# Patient Record
Sex: MALE | Race: WHITE | Employment: FULL TIME | URBAN - METROPOLITAN AREA
[De-identification: names, ages, dates, MRNs, and addresses within clinical notes are randomized per-mention and may not be internally consistent; named-entity substitution may affect disease eponyms.]

---

## 2018-03-24 ENCOUNTER — OFFICE VISIT (OUTPATIENT)
Dept: FAMILY MEDICINE CLINIC | Facility: CLINIC | Age: 43
End: 2018-03-24

## 2018-03-24 VITALS
SYSTOLIC BLOOD PRESSURE: 110 MMHG | TEMPERATURE: 98 F | DIASTOLIC BLOOD PRESSURE: 68 MMHG | OXYGEN SATURATION: 99 % | HEART RATE: 91 BPM

## 2018-03-24 DIAGNOSIS — J06.9 VIRAL UPPER RESPIRATORY TRACT INFECTION: Primary | ICD-10-CM

## 2018-03-24 LAB
CONTROL LINE PRESENT WITH A CLEAR BACKGROUND (YES/NO): YES YES/NO
KIT EXPIRATION DATE: 11.2 DATE

## 2018-03-24 PROCEDURE — 87880 STREP A ASSAY W/OPTIC: CPT | Performed by: NURSE PRACTITIONER

## 2018-03-24 PROCEDURE — 99213 OFFICE O/P EST LOW 20 MIN: CPT | Performed by: NURSE PRACTITIONER

## 2018-03-24 NOTE — PROGRESS NOTES
CHIEF COMPLAINT:   Patient presents with:  URI      HPI:   Donya Aguayo is a 41year old male who presents for upper respiratory symptoms for  3 days. Patient reports sore throat, congestion, cough with yellow colored sputum, denies fever.  Symptoms have bee Ivonne Sharif is a 41year old male who presents with upper respiratory symptoms that are consistent with    ASSESSMENT:   Viral upper respiratory tract infection  (primary encounter diagnosis)    PLAN: Meds as below.   Comfort care as described in Patient In · You may use acetaminophen or ibuprofen to control pain and fever, unless another medicine was prescribed. If you have chronic liver or kidney disease, have ever had a stomach ulcer or gastrointestinal bleeding, or are taking blood-thinning medicines, amy

## 2018-03-26 ENCOUNTER — OFFICE VISIT (OUTPATIENT)
Dept: FAMILY MEDICINE CLINIC | Facility: CLINIC | Age: 43
End: 2018-03-26

## 2018-03-26 ENCOUNTER — TELEPHONE (OUTPATIENT)
Dept: FAMILY MEDICINE CLINIC | Facility: CLINIC | Age: 43
End: 2018-03-26

## 2018-03-26 VITALS
DIASTOLIC BLOOD PRESSURE: 80 MMHG | BODY MASS INDEX: 33.12 KG/M2 | WEIGHT: 218.5 LBS | OXYGEN SATURATION: 98 % | TEMPERATURE: 98 F | HEART RATE: 88 BPM | SYSTOLIC BLOOD PRESSURE: 120 MMHG | HEIGHT: 68 IN

## 2018-03-26 DIAGNOSIS — Z00.00 ROUTINE GENERAL MEDICAL EXAMINATION AT A HEALTH CARE FACILITY: Primary | ICD-10-CM

## 2018-03-26 DIAGNOSIS — E66.9 OBESITY (BMI 30.0-34.9): ICD-10-CM

## 2018-03-26 DIAGNOSIS — J30.89 NON-SEASONAL ALLERGIC RHINITIS, UNSPECIFIED TRIGGER: ICD-10-CM

## 2018-03-26 DIAGNOSIS — Z00.00 LABORATORY EXAM ORDERED AS PART OF ROUTINE GENERAL MEDICAL EXAMINATION: ICD-10-CM

## 2018-03-26 DIAGNOSIS — R05.9 COUGH: ICD-10-CM

## 2018-03-26 DIAGNOSIS — Z23 NEED FOR DIPHTHERIA-TETANUS-PERTUSSIS (TDAP) VACCINE: ICD-10-CM

## 2018-03-26 PROBLEM — E66.811 OBESITY (BMI 30.0-34.9): Status: ACTIVE | Noted: 2018-03-26

## 2018-03-26 PROCEDURE — 90471 IMMUNIZATION ADMIN: CPT | Performed by: FAMILY MEDICINE

## 2018-03-26 PROCEDURE — 90715 TDAP VACCINE 7 YRS/> IM: CPT | Performed by: FAMILY MEDICINE

## 2018-03-26 PROCEDURE — 99386 PREV VISIT NEW AGE 40-64: CPT | Performed by: FAMILY MEDICINE

## 2018-03-26 RX ORDER — PREDNISONE 20 MG/1
TABLET ORAL
Qty: 15 TABLET | Refills: 0 | Status: SHIPPED | OUTPATIENT
Start: 2018-03-26 | End: 2018-04-16 | Stop reason: ALTCHOICE

## 2018-03-26 RX ORDER — FLUTICASONE PROPIONATE 50 MCG
2 SPRAY, SUSPENSION (ML) NASAL DAILY
Qty: 3 BOTTLE | Refills: 3 | Status: SHIPPED | OUTPATIENT
Start: 2018-03-26 | End: 2019-03-21

## 2018-03-26 RX ORDER — PREDNISONE 20 MG/1
TABLET ORAL
Qty: 15 TABLET | Refills: 0 | Status: SHIPPED | OUTPATIENT
Start: 2018-03-26 | End: 2018-03-26

## 2018-03-26 NOTE — PROGRESS NOTES
Juanjose Amador is a 41year old male. CC:  Patient presents with:  Physical: seen in walkin care for cough. . pt reports feeling better. .room 5      HPI:  Here for routine  States he as a current cough  Is sl better on delsym  dayquil    Still sporadic no exertion  cough  CARDIOVASCULAR: denies chest pain on exertion  GI: denies abdominal pain,denies heartburn   : denies nocturia or changes in stream  MUSCULOSKELETAL: denies back pain  NEURO: denies headaches  PSYCHE: denies depression or anxiety  HEMATOL DIFFERENTIAL WITH PLATELET    COMP METABOLIC PANEL (14)    LIPID PANEL    VITAMIN D, 25-HYDROXY            No orders of the defined types were placed in this encounter.       Diagnoses and all orders for this visit:    Routine general medical examination at

## 2018-03-28 ENCOUNTER — LABORATORY ENCOUNTER (OUTPATIENT)
Dept: LAB | Age: 43
End: 2018-03-28
Attending: FAMILY MEDICINE
Payer: COMMERCIAL

## 2018-03-28 DIAGNOSIS — Z00.00 LABORATORY EXAM ORDERED AS PART OF ROUTINE GENERAL MEDICAL EXAMINATION: ICD-10-CM

## 2018-03-28 DIAGNOSIS — R73.09 ELEVATED GLUCOSE: ICD-10-CM

## 2018-03-28 LAB
25-HYDROXYVITAMIN D (TOTAL): 10.8 NG/ML (ref 30–100)
ALBUMIN SERPL-MCNC: 3.8 G/DL (ref 3.5–4.8)
ALP LIVER SERPL-CCNC: 82 U/L (ref 45–117)
ALT SERPL-CCNC: 55 U/L (ref 17–63)
AST SERPL-CCNC: 37 U/L (ref 15–41)
BASOPHILS # BLD AUTO: 0.03 X10(3) UL (ref 0–0.1)
BASOPHILS NFR BLD AUTO: 0.4 %
BILIRUB SERPL-MCNC: 0.6 MG/DL (ref 0.1–2)
BUN BLD-MCNC: 18 MG/DL (ref 8–20)
CALCIUM BLD-MCNC: 9.2 MG/DL (ref 8.3–10.3)
CHLORIDE: 105 MMOL/L (ref 101–111)
CHOLEST SMN-MCNC: 208 MG/DL (ref ?–200)
CO2: 27 MMOL/L (ref 22–32)
CREAT BLD-MCNC: 1.23 MG/DL (ref 0.7–1.3)
EOSINOPHIL # BLD AUTO: 0.06 X10(3) UL (ref 0–0.3)
EOSINOPHIL NFR BLD AUTO: 0.9 %
ERYTHROCYTE [DISTWIDTH] IN BLOOD BY AUTOMATED COUNT: 12.9 % (ref 11.5–16)
GLUCOSE BLD-MCNC: 112 MG/DL (ref 70–99)
HCT VFR BLD AUTO: 48.5 % (ref 37–53)
HDLC SERPL-MCNC: 30 MG/DL (ref 45–?)
HDLC SERPL: 6.93 {RATIO} (ref ?–4.97)
HGB BLD-MCNC: 16.3 G/DL (ref 13–17)
IMMATURE GRANULOCYTE COUNT: 0.04 X10(3) UL (ref 0–1)
IMMATURE GRANULOCYTE RATIO %: 0.6 %
LDLC SERPL CALC-MCNC: 130 MG/DL (ref ?–130)
LYMPHOCYTES # BLD AUTO: 2.17 X10(3) UL (ref 0.9–4)
LYMPHOCYTES NFR BLD AUTO: 31.4 %
M PROTEIN MFR SERPL ELPH: 7.5 G/DL (ref 6.1–8.3)
MCH RBC QN AUTO: 32.8 PG (ref 27–33.2)
MCHC RBC AUTO-ENTMCNC: 33.6 G/DL (ref 31–37)
MCV RBC AUTO: 97.6 FL (ref 80–99)
MONOCYTES # BLD AUTO: 0.62 X10(3) UL (ref 0.1–1)
MONOCYTES NFR BLD AUTO: 9 %
NEUTROPHIL ABS PRELIM: 4 X10 (3) UL (ref 1.3–6.7)
NEUTROPHILS # BLD AUTO: 4 X10(3) UL (ref 1.3–6.7)
NEUTROPHILS NFR BLD AUTO: 57.7 %
NONHDLC SERPL-MCNC: 178 MG/DL (ref ?–130)
PLATELET # BLD AUTO: 293 10(3)UL (ref 150–450)
POTASSIUM SERPL-SCNC: 3.9 MMOL/L (ref 3.6–5.1)
RBC # BLD AUTO: 4.97 X10(6)UL (ref 4.3–5.7)
RED CELL DISTRIBUTION WIDTH-SD: 46.1 FL (ref 35.1–46.3)
SODIUM SERPL-SCNC: 142 MMOL/L (ref 136–144)
TRIGL SERPL-MCNC: 240 MG/DL (ref ?–150)
VLDLC SERPL CALC-MCNC: 48 MG/DL (ref 5–40)
WBC # BLD AUTO: 6.9 X10(3) UL (ref 4–13)

## 2018-03-28 PROCEDURE — 83036 HEMOGLOBIN GLYCOSYLATED A1C: CPT | Performed by: FAMILY MEDICINE

## 2018-03-28 PROCEDURE — 82306 VITAMIN D 25 HYDROXY: CPT | Performed by: FAMILY MEDICINE

## 2018-03-28 PROCEDURE — 80061 LIPID PANEL: CPT | Performed by: FAMILY MEDICINE

## 2018-03-28 PROCEDURE — 80053 COMPREHEN METABOLIC PANEL: CPT | Performed by: FAMILY MEDICINE

## 2018-03-28 PROCEDURE — 85025 COMPLETE CBC W/AUTO DIFF WBC: CPT | Performed by: FAMILY MEDICINE

## 2018-03-28 PROCEDURE — 36415 COLL VENOUS BLD VENIPUNCTURE: CPT | Performed by: FAMILY MEDICINE

## 2018-03-30 LAB
EST. AVERAGE GLUCOSE BLD GHB EST-MCNC: 137 MG/DL (ref 68–126)
HBA1C MFR BLD HPLC: 6.4 % (ref ?–5.7)

## 2018-04-16 ENCOUNTER — OFFICE VISIT (OUTPATIENT)
Dept: FAMILY MEDICINE CLINIC | Facility: CLINIC | Age: 43
End: 2018-04-16

## 2018-04-16 VITALS
DIASTOLIC BLOOD PRESSURE: 76 MMHG | SYSTOLIC BLOOD PRESSURE: 120 MMHG | TEMPERATURE: 98 F | BODY MASS INDEX: 34 KG/M2 | WEIGHT: 222.13 LBS

## 2018-04-16 DIAGNOSIS — J01.90 ACUTE RHINOSINUSITIS: ICD-10-CM

## 2018-04-16 DIAGNOSIS — H65.01 RIGHT ACUTE SEROUS OTITIS MEDIA, RECURRENCE NOT SPECIFIED: Primary | ICD-10-CM

## 2018-04-16 PROCEDURE — 99213 OFFICE O/P EST LOW 20 MIN: CPT | Performed by: FAMILY MEDICINE

## 2018-04-16 RX ORDER — AMOXICILLIN AND CLAVULANATE POTASSIUM 875; 125 MG/1; MG/1
1 TABLET, FILM COATED ORAL 2 TIMES DAILY
Qty: 20 TABLET | Refills: 0 | Status: SHIPPED | OUTPATIENT
Start: 2018-04-16 | End: 2018-04-26

## 2018-04-16 RX ORDER — PREDNISONE 20 MG/1
TABLET ORAL
Qty: 15 TABLET | Refills: 0 | Status: SHIPPED | OUTPATIENT
Start: 2018-04-16 | End: 2019-11-21 | Stop reason: ALTCHOICE

## 2018-04-16 NOTE — PATIENT INSTRUCTIONS
Amoxicillin; Clavulanic Acid tablets  Brand Name: Augmentin  What is this medicine? AMOXICILLIN; CLAVULANIC ACID (a mox i REYNALDO in; AYUSH murguia ic AS id) is a penicillin antibiotic. It is used to treat certain kinds of bacterial infections.  It will not w If you miss a dose, take it as soon as you can. If it is almost time for your next dose, take only that dose. Do not take double or extra doses. Where should I keep my medicine? Keep out of the reach of children.   Store at room temperature below 25 degre

## 2018-04-16 NOTE — PROGRESS NOTES
HPI:   Bianca Alonzo is a 41year old male who presents for upper respiratory symptoms for  4  days.  Patient reports sore throat, congestion, ear pain, sinus pain, OTC cold meds have not been helping, denies fever     Used heating pad   Hears popping in the otitis media, recurrence not specified  (primary encounter diagnosis)  Acute rhinosinusitis    Problem List Items Addressed This Visit     None      Visit Diagnoses     Right acute serous otitis media, recurrence not specified    -  Primary    Relevant Med

## 2019-11-21 ENCOUNTER — OFFICE VISIT (OUTPATIENT)
Dept: FAMILY MEDICINE CLINIC | Facility: CLINIC | Age: 44
End: 2019-11-21
Payer: COMMERCIAL

## 2019-11-21 VITALS
WEIGHT: 220 LBS | TEMPERATURE: 98 F | HEIGHT: 68 IN | OXYGEN SATURATION: 99 % | SYSTOLIC BLOOD PRESSURE: 122 MMHG | HEART RATE: 86 BPM | RESPIRATION RATE: 18 BRPM | BODY MASS INDEX: 33.34 KG/M2 | DIASTOLIC BLOOD PRESSURE: 72 MMHG

## 2019-11-21 DIAGNOSIS — J01.40 ACUTE NON-RECURRENT PANSINUSITIS: Primary | ICD-10-CM

## 2019-11-21 DIAGNOSIS — E66.9 OBESITY (BMI 30.0-34.9): ICD-10-CM

## 2019-11-21 DIAGNOSIS — R73.03 PREDIABETES: ICD-10-CM

## 2019-11-21 DIAGNOSIS — J20.9 ACUTE BRONCHITIS, UNSPECIFIED ORGANISM: ICD-10-CM

## 2019-11-21 PROCEDURE — 99214 OFFICE O/P EST MOD 30 MIN: CPT | Performed by: NURSE PRACTITIONER

## 2019-11-21 RX ORDER — AMOXICILLIN AND CLAVULANATE POTASSIUM 875; 125 MG/1; MG/1
1 TABLET, FILM COATED ORAL 2 TIMES DAILY
Qty: 20 TABLET | Refills: 0 | Status: SHIPPED | OUTPATIENT
Start: 2019-11-21 | End: 2019-12-01

## 2019-11-21 RX ORDER — PREDNISONE 20 MG/1
20 TABLET ORAL 2 TIMES DAILY
Qty: 10 TABLET | Refills: 0 | Status: SHIPPED | OUTPATIENT
Start: 2019-11-21 | End: 2019-11-26

## 2019-11-21 NOTE — PROGRESS NOTES
HPI:   Sinus Problem   This is a new problem. Episode onset: 2 weeks ago. The problem has been waxing and waning since onset. Maximum temperature: first few days. Associated symptoms include chills, congestion, coughing, sinus pressure and a sore throat. tympanic membrane and ear canal normal.   Nose: Mucosal edema and rhinorrhea present. Mouth/Throat: Uvula is midline and mucous membranes are normal. Posterior oropharyngeal erythema present.  No oropharyngeal exudate, posterior oropharyngeal edema or ton

## 2021-06-08 ENCOUNTER — TELEPHONE (OUTPATIENT)
Dept: FAMILY MEDICINE CLINIC | Facility: CLINIC | Age: 46
End: 2021-06-08

## 2021-06-08 NOTE — TELEPHONE ENCOUNTER
Spouse noted a red \"spot\" on shoulder a few days ago. She is requesting referral to a dermatologist. No hx of skin ca or skin problems.     Advised will need to check w/Dr. Calvo tomorrow regarding referral request.      Would you like to see the patien

## 2021-06-09 NOTE — TELEPHONE ENCOUNTER
Advised spouse that patient has not been seen since 2018.  Patient will need to be seen before we can send a referral or patient can try to schedule without a referral.

## 2021-06-25 ENCOUNTER — OFFICE VISIT (OUTPATIENT)
Dept: FAMILY MEDICINE CLINIC | Facility: CLINIC | Age: 46
End: 2021-06-25
Payer: COMMERCIAL

## 2021-06-25 ENCOUNTER — LABORATORY ENCOUNTER (OUTPATIENT)
Dept: LAB | Age: 46
End: 2021-06-25
Attending: FAMILY MEDICINE
Payer: COMMERCIAL

## 2021-06-25 VITALS
OXYGEN SATURATION: 99 % | SYSTOLIC BLOOD PRESSURE: 126 MMHG | WEIGHT: 223.5 LBS | RESPIRATION RATE: 16 BRPM | HEART RATE: 86 BPM | TEMPERATURE: 98 F | DIASTOLIC BLOOD PRESSURE: 78 MMHG | BODY MASS INDEX: 33.87 KG/M2 | HEIGHT: 68 IN

## 2021-06-25 DIAGNOSIS — E66.9 OBESITY (BMI 30.0-34.9): ICD-10-CM

## 2021-06-25 DIAGNOSIS — D22.9 MULTIPLE NEVI: ICD-10-CM

## 2021-06-25 DIAGNOSIS — Z13.1 SCREENING FOR DIABETES MELLITUS: ICD-10-CM

## 2021-06-25 DIAGNOSIS — Z00.00 LABORATORY EXAMINATION ORDERED AS PART OF A ROUTINE GENERAL MEDICAL EXAMINATION: ICD-10-CM

## 2021-06-25 DIAGNOSIS — Z13.0 SCREENING, ANEMIA, DEFICIENCY, IRON: ICD-10-CM

## 2021-06-25 DIAGNOSIS — R73.9 HYPERGLYCEMIA: ICD-10-CM

## 2021-06-25 DIAGNOSIS — Z13.29 SCREENING FOR THYROID DISORDER: ICD-10-CM

## 2021-06-25 DIAGNOSIS — Z13.220 SCREENING, LIPID: ICD-10-CM

## 2021-06-25 DIAGNOSIS — Z00.00 WELL ADULT EXAM: Primary | ICD-10-CM

## 2021-06-25 PROCEDURE — 3078F DIAST BP <80 MM HG: CPT | Performed by: FAMILY MEDICINE

## 2021-06-25 PROCEDURE — 99396 PREV VISIT EST AGE 40-64: CPT | Performed by: FAMILY MEDICINE

## 2021-06-25 PROCEDURE — 3008F BODY MASS INDEX DOCD: CPT | Performed by: FAMILY MEDICINE

## 2021-06-25 PROCEDURE — 3074F SYST BP LT 130 MM HG: CPT | Performed by: FAMILY MEDICINE

## 2021-06-25 PROCEDURE — 83036 HEMOGLOBIN GLYCOSYLATED A1C: CPT | Performed by: FAMILY MEDICINE

## 2021-06-25 PROCEDURE — 84439 ASSAY OF FREE THYROXINE: CPT | Performed by: FAMILY MEDICINE

## 2021-06-25 PROCEDURE — 80050 GENERAL HEALTH PANEL: CPT | Performed by: FAMILY MEDICINE

## 2021-06-25 PROCEDURE — 80061 LIPID PANEL: CPT | Performed by: FAMILY MEDICINE

## 2021-06-25 NOTE — PROGRESS NOTES
HPI/Subjective:   Yuridia Tinoco is a 42year old male who presents for Physical ( Patient has a spot on shoulder he would like to have looked at and labs )     ***  History/Other:   No current outpatient medications on file.       Review of Systems:  GENERA

## 2021-06-26 NOTE — PROGRESS NOTES
Solo Garcia is a 42year old male.     CC:  Patient presents with:  Physical:  Patient has a spot on shoulder he would like to have looked at and labs       HPI/Subjective:    Chief Complaint Reviewed and Verified  Nursing Notes Reviewed and   Verified (99.8 kg)  04/16/18 : 222 lb 2 oz (100.8 kg)  03/26/18 : 218 lb 8 oz (99.1 kg)      BP Readings from Last 3 Encounters:  06/25/21 : 126/78  11/21/19 : 122/72  04/16/18 : 120/76    REVIEW OF SYSTEMS:   GENERAL: feels well otherwise  SKIN: see HPI   EYES:den Discussed weight loss through caloric reduction and aerobic exercise               Other Visit Diagnoses     Well adult exam    -  Primary    Laboratory examination ordered as part of a routine general medical examination        Relevant Orders    CBC WIT

## 2021-06-29 ENCOUNTER — PATIENT MESSAGE (OUTPATIENT)
Dept: FAMILY MEDICINE CLINIC | Facility: CLINIC | Age: 46
End: 2021-06-29

## 2021-06-29 DIAGNOSIS — R73.9 HYPERGLYCEMIA: Primary | ICD-10-CM

## 2021-06-29 DIAGNOSIS — Z13.0 SCREENING, ANEMIA, DEFICIENCY, IRON: ICD-10-CM

## 2021-06-29 DIAGNOSIS — E03.9 ACQUIRED HYPOTHYROIDISM: ICD-10-CM

## 2021-06-29 DIAGNOSIS — E78.2 MIXED HYPERLIPIDEMIA: ICD-10-CM

## 2021-06-29 DIAGNOSIS — I10 ESSENTIAL HYPERTENSION, BENIGN: ICD-10-CM

## 2021-06-29 RX ORDER — ATORVASTATIN CALCIUM 20 MG/1
20 TABLET, FILM COATED ORAL NIGHTLY
Qty: 90 TABLET | Refills: 1 | Status: SHIPPED | OUTPATIENT
Start: 2021-06-29 | End: 2021-10-05

## 2021-06-29 RX ORDER — METFORMIN HYDROCHLORIDE 500 MG/1
500 TABLET, EXTENDED RELEASE ORAL
Qty: 90 TABLET | Refills: 1 | Status: SHIPPED | OUTPATIENT
Start: 2021-06-29 | End: 2021-10-05

## 2021-06-29 NOTE — TELEPHONE ENCOUNTER
From: Toney Chappell  To: Niraj Aviles MD  Sent: 6/29/2021 11:48 AM CDT  Subject: Prescription Question    I saw the lab results and your RX suggestion in the [employer] [facility].  Do I need to set an appointment to return to get the RX's or can you just send th

## 2021-06-29 NOTE — TELEPHONE ENCOUNTER
From: Yesenia Sanchez  To: Jeanine Jackson MD  Sent: 6/29/2021 12:31 PM CDT  Subject: Non-Urgent Medical Question    Ok thanks!  In reference to the 6 month and 1 year labs, is there a set appointment date / time already reserved for those or do I need to call

## 2021-06-30 NOTE — TELEPHONE ENCOUNTER
From: Azalea Artis  To: Kary Carter MD  Sent: 6/29/2021 9:42 PM CDT  Subject: Prescription Question    Can I take Nexium with my new medications?

## 2021-09-09 ENCOUNTER — PATIENT MESSAGE (OUTPATIENT)
Dept: FAMILY MEDICINE CLINIC | Facility: CLINIC | Age: 46
End: 2021-09-09

## 2021-09-09 NOTE — TELEPHONE ENCOUNTER
[id number] Joyce Pritchett for this  I approve    We can send strips  But need to know the type of monitor and strips

## 2021-09-09 NOTE — TELEPHONE ENCOUNTER
From: Carlos Kiran  To: Iveth Sierra MD  Sent: 9/9/2021 5:11 AM CDT  Subject: Prescription Question    [facility].  Since they noticed that you prescribed me Metformin, they sent me a glucose meter and sample princess

## 2021-09-10 RX ORDER — BLOOD SUGAR DIAGNOSTIC
STRIP MISCELLANEOUS
Qty: 100 STRIP | Refills: 1 | Status: SHIPPED | OUTPATIENT
Start: 2021-09-10 | End: 2022-09-10

## 2021-09-10 RX ORDER — BLOOD SUGAR DIAGNOSTIC
STRIP MISCELLANEOUS
Qty: 100 STRIP | Refills: 1 | Status: SHIPPED | OUTPATIENT
Start: 2021-09-10 | End: 2021-09-10

## 2021-09-22 ENCOUNTER — TELEPHONE (OUTPATIENT)
Dept: FAMILY MEDICINE CLINIC | Facility: CLINIC | Age: 46
End: 2021-09-22

## 2021-09-22 ENCOUNTER — MED REC SCAN ONLY (OUTPATIENT)
Dept: FAMILY MEDICINE CLINIC | Facility: CLINIC | Age: 46
End: 2021-09-22

## 2021-10-05 DIAGNOSIS — E78.2 MIXED HYPERLIPIDEMIA: ICD-10-CM

## 2021-10-05 DIAGNOSIS — R73.9 HYPERGLYCEMIA: ICD-10-CM

## 2021-10-05 RX ORDER — ATORVASTATIN CALCIUM 20 MG/1
20 TABLET, FILM COATED ORAL NIGHTLY
Qty: 90 TABLET | Refills: 1 | Status: SHIPPED | OUTPATIENT
Start: 2021-10-05

## 2021-10-05 RX ORDER — METFORMIN HYDROCHLORIDE 500 MG/1
500 TABLET, EXTENDED RELEASE ORAL
Qty: 90 TABLET | Refills: 1 | Status: SHIPPED | OUTPATIENT
Start: 2021-10-05

## 2021-10-05 NOTE — TELEPHONE ENCOUNTER
LOV 06/25/2021    LAST LAB 06/25/2021    LAST RX  Metformin  Atorvastatin    Next OV No future appointments.     PROTOCOL

## 2021-10-08 ENCOUNTER — PATIENT MESSAGE (OUTPATIENT)
Dept: FAMILY MEDICINE CLINIC | Facility: CLINIC | Age: 46
End: 2021-10-08

## 2021-10-09 NOTE — TELEPHONE ENCOUNTER
From: Solo Garcia  To: Kathia Maldonado MD  Sent: 10/8/2021 7:12 PM CDT  Subject: Metformin causing heartburn/ phlegm    Prior to being prescribed Metformin I was taking Nexium for heartburn symptoms.  I didn’t have any chest area pain, but I was experiencin

## 2022-01-13 ENCOUNTER — TELEPHONE (OUTPATIENT)
Dept: FAMILY MEDICINE CLINIC | Facility: CLINIC | Age: 47
End: 2022-01-13

## 2022-07-01 ENCOUNTER — OFFICE VISIT (OUTPATIENT)
Dept: FAMILY MEDICINE CLINIC | Facility: CLINIC | Age: 47
End: 2022-07-01
Payer: COMMERCIAL

## 2022-07-01 ENCOUNTER — LABORATORY ENCOUNTER (OUTPATIENT)
Dept: LAB | Age: 47
End: 2022-07-01
Attending: FAMILY MEDICINE
Payer: COMMERCIAL

## 2022-07-01 ENCOUNTER — TELEPHONE (OUTPATIENT)
Dept: FAMILY MEDICINE CLINIC | Facility: CLINIC | Age: 47
End: 2022-07-01

## 2022-07-01 VITALS
TEMPERATURE: 98 F | WEIGHT: 214 LBS | HEIGHT: 68 IN | RESPIRATION RATE: 16 BRPM | HEART RATE: 74 BPM | OXYGEN SATURATION: 98 % | DIASTOLIC BLOOD PRESSURE: 80 MMHG | SYSTOLIC BLOOD PRESSURE: 120 MMHG | BODY MASS INDEX: 32.43 KG/M2

## 2022-07-01 DIAGNOSIS — E03.9 ACQUIRED HYPOTHYROIDISM: ICD-10-CM

## 2022-07-01 DIAGNOSIS — E11.9 TYPE 2 DIABETES MELLITUS WITHOUT COMPLICATION, WITHOUT LONG-TERM CURRENT USE OF INSULIN (HCC): ICD-10-CM

## 2022-07-01 DIAGNOSIS — E78.2 MIXED HYPERLIPIDEMIA: ICD-10-CM

## 2022-07-01 DIAGNOSIS — J01.40 ACUTE NON-RECURRENT PANSINUSITIS: Primary | ICD-10-CM

## 2022-07-01 DIAGNOSIS — Z13.0 SCREENING, ANEMIA, DEFICIENCY, IRON: ICD-10-CM

## 2022-07-01 LAB
ALBUMIN SERPL-MCNC: 4.1 G/DL (ref 3.4–5)
ALBUMIN/GLOB SERPL: 1.3 {RATIO} (ref 1–2)
ALP LIVER SERPL-CCNC: 87 U/L
ALT SERPL-CCNC: 43 U/L
ANION GAP SERPL CALC-SCNC: 5 MMOL/L (ref 0–18)
AST SERPL-CCNC: 33 U/L (ref 15–37)
BASOPHILS # BLD AUTO: 0.05 X10(3) UL (ref 0–0.2)
BASOPHILS NFR BLD AUTO: 0.6 %
BILIRUB SERPL-MCNC: 0.6 MG/DL (ref 0.1–2)
BUN BLD-MCNC: 18 MG/DL (ref 7–18)
CALCIUM BLD-MCNC: 10 MG/DL (ref 8.5–10.1)
CHLORIDE SERPL-SCNC: 106 MMOL/L (ref 98–112)
CHOLEST SERPL-MCNC: 236 MG/DL (ref ?–200)
CO2 SERPL-SCNC: 28 MMOL/L (ref 21–32)
CREAT BLD-MCNC: 1.41 MG/DL
CREAT UR-SCNC: 197 MG/DL
EOSINOPHIL # BLD AUTO: 0.17 X10(3) UL (ref 0–0.7)
EOSINOPHIL NFR BLD AUTO: 2.2 %
ERYTHROCYTE [DISTWIDTH] IN BLOOD BY AUTOMATED COUNT: 13 %
EST. AVERAGE GLUCOSE BLD GHB EST-MCNC: 154 MG/DL (ref 68–126)
FASTING PATIENT LIPID ANSWER: NO
FASTING STATUS PATIENT QL REPORTED: NO
GLOBULIN PLAS-MCNC: 3.2 G/DL (ref 2.8–4.4)
GLUCOSE BLD-MCNC: 134 MG/DL (ref 70–99)
HBA1C MFR BLD: 7 % (ref ?–5.7)
HCT VFR BLD AUTO: 50.9 %
HDLC SERPL-MCNC: 36 MG/DL (ref 40–59)
HGB BLD-MCNC: 16.6 G/DL
IMM GRANULOCYTES # BLD AUTO: 0.04 X10(3) UL (ref 0–1)
IMM GRANULOCYTES NFR BLD: 0.5 %
LDLC SERPL CALC-MCNC: 143 MG/DL (ref ?–100)
LYMPHOCYTES # BLD AUTO: 2.17 X10(3) UL (ref 1–4)
LYMPHOCYTES NFR BLD AUTO: 27.9 %
MCH RBC QN AUTO: 32.4 PG (ref 26–34)
MCHC RBC AUTO-ENTMCNC: 32.6 G/DL (ref 31–37)
MCV RBC AUTO: 99.2 FL
MICROALBUMIN UR-MCNC: 1.39 MG/DL
MICROALBUMIN/CREAT 24H UR-RTO: 7.1 UG/MG (ref ?–30)
MONOCYTES # BLD AUTO: 0.52 X10(3) UL (ref 0.1–1)
MONOCYTES NFR BLD AUTO: 6.7 %
NEUTROPHILS # BLD AUTO: 4.84 X10 (3) UL (ref 1.5–7.7)
NEUTROPHILS # BLD AUTO: 4.84 X10(3) UL (ref 1.5–7.7)
NEUTROPHILS NFR BLD AUTO: 62.1 %
NONHDLC SERPL-MCNC: 200 MG/DL (ref ?–130)
OSMOLALITY SERPL CALC.SUM OF ELEC: 292 MOSM/KG (ref 275–295)
PLATELET # BLD AUTO: 271 10(3)UL (ref 150–450)
POTASSIUM SERPL-SCNC: 5 MMOL/L (ref 3.5–5.1)
PROT SERPL-MCNC: 7.3 G/DL (ref 6.4–8.2)
RBC # BLD AUTO: 5.13 X10(6)UL
SODIUM SERPL-SCNC: 139 MMOL/L (ref 136–145)
T4 FREE SERPL-MCNC: 0.9 NG/DL (ref 0.8–1.7)
TRIGL SERPL-MCNC: 310 MG/DL (ref 30–149)
TSI SER-ACNC: 1.9 MIU/ML (ref 0.36–3.74)
VLDLC SERPL CALC-MCNC: 59 MG/DL (ref 0–30)
WBC # BLD AUTO: 7.8 X10(3) UL (ref 4–11)

## 2022-07-01 PROCEDURE — 84439 ASSAY OF FREE THYROXINE: CPT

## 2022-07-01 PROCEDURE — 85025 COMPLETE CBC W/AUTO DIFF WBC: CPT

## 2022-07-01 PROCEDURE — 82570 ASSAY OF URINE CREATININE: CPT

## 2022-07-01 PROCEDURE — 36415 COLL VENOUS BLD VENIPUNCTURE: CPT

## 2022-07-01 PROCEDURE — 3079F DIAST BP 80-89 MM HG: CPT | Performed by: FAMILY MEDICINE

## 2022-07-01 PROCEDURE — 3008F BODY MASS INDEX DOCD: CPT | Performed by: FAMILY MEDICINE

## 2022-07-01 PROCEDURE — 82043 UR ALBUMIN QUANTITATIVE: CPT

## 2022-07-01 PROCEDURE — 99214 OFFICE O/P EST MOD 30 MIN: CPT | Performed by: FAMILY MEDICINE

## 2022-07-01 PROCEDURE — 80061 LIPID PANEL: CPT

## 2022-07-01 PROCEDURE — 3051F HG A1C>EQUAL 7.0%<8.0%: CPT | Performed by: FAMILY MEDICINE

## 2022-07-01 PROCEDURE — 84443 ASSAY THYROID STIM HORMONE: CPT

## 2022-07-01 PROCEDURE — 80053 COMPREHEN METABOLIC PANEL: CPT

## 2022-07-01 PROCEDURE — 3074F SYST BP LT 130 MM HG: CPT | Performed by: FAMILY MEDICINE

## 2022-07-01 PROCEDURE — 83036 HEMOGLOBIN GLYCOSYLATED A1C: CPT

## 2022-07-01 RX ORDER — AMOXICILLIN AND CLAVULANATE POTASSIUM 875; 125 MG/1; MG/1
1 TABLET, FILM COATED ORAL 2 TIMES DAILY
Qty: 20 TABLET | Refills: 0 | Status: SHIPPED | OUTPATIENT
Start: 2022-07-01 | End: 2022-07-11

## 2022-07-01 NOTE — TELEPHONE ENCOUNTER
Pt was prescribed medication today. He said it should have been sent to [facility] in [place].

## 2022-07-01 NOTE — PATIENT INSTRUCTIONS
Recommend nasal saline  Augmentin 875 mg twice daily x10 days taken with food, potential GI side effects discussed  I reviewed goals for fasting and postmeal blood sugars as well as hemoglobin A1c, blood pressure lipids. I reviewed conservative management of hypertension including sodium restriction, daily aerobic activity, alcohol moderation, and maintaining ideal body weight. I discussed importance of annual dilated retinal exams and glaucoma screening, routine foot exams and good fitting footwear. Will check lipids, A1c, CMP, CBC, microalbumin screen and thyroid function studies.   Patient advised to follow-up with his PCP for discussion of ongoing medical conditions and review of lab results

## 2022-10-18 ENCOUNTER — TELEPHONE (OUTPATIENT)
Dept: FAMILY MEDICINE CLINIC | Facility: CLINIC | Age: 47
End: 2022-10-18

## 2022-10-18 NOTE — TELEPHONE ENCOUNTER
He needs a follow up for medications soon because he needs the diabetic medicine.  He was here for the labs she said

## 2022-10-18 NOTE — TELEPHONE ENCOUNTER
Future Appointments   Date Time Provider Grey Mejia   10/27/2022  9:40 AM Gleason, Cephas Galeazzi, MD [facility]   10/27/2022 10:30 AM REF [facility] REF [facility] Ref Lab [facility]

## 2022-10-27 ENCOUNTER — LABORATORY ENCOUNTER (OUTPATIENT)
Dept: LAB | Age: 47
End: 2022-10-27
Attending: FAMILY MEDICINE
Payer: COMMERCIAL

## 2022-10-27 ENCOUNTER — PATIENT MESSAGE (OUTPATIENT)
Dept: FAMILY MEDICINE CLINIC | Facility: CLINIC | Age: 47
End: 2022-10-27

## 2022-10-27 ENCOUNTER — OFFICE VISIT (OUTPATIENT)
Dept: FAMILY MEDICINE CLINIC | Facility: CLINIC | Age: 47
End: 2022-10-27
Payer: COMMERCIAL

## 2022-10-27 VITALS
WEIGHT: 216 LBS | BODY MASS INDEX: 33.12 KG/M2 | RESPIRATION RATE: 12 BRPM | DIASTOLIC BLOOD PRESSURE: 78 MMHG | SYSTOLIC BLOOD PRESSURE: 110 MMHG | OXYGEN SATURATION: 96 % | TEMPERATURE: 97 F | HEIGHT: 67.75 IN | HEART RATE: 64 BPM

## 2022-10-27 DIAGNOSIS — E66.9 OBESITY (BMI 30.0-34.9): ICD-10-CM

## 2022-10-27 DIAGNOSIS — E78.2 MIXED HYPERLIPIDEMIA: ICD-10-CM

## 2022-10-27 DIAGNOSIS — Z28.21 REFUSED INFLUENZA VACCINE: ICD-10-CM

## 2022-10-27 DIAGNOSIS — Z00.00 LABORATORY EXAMINATION ORDERED AS PART OF A ROUTINE GENERAL MEDICAL EXAMINATION: ICD-10-CM

## 2022-10-27 DIAGNOSIS — E11.9 TYPE 2 DIABETES MELLITUS WITHOUT COMPLICATION, WITHOUT LONG-TERM CURRENT USE OF INSULIN (HCC): ICD-10-CM

## 2022-10-27 DIAGNOSIS — R73.9 HYPERGLYCEMIA: ICD-10-CM

## 2022-10-27 DIAGNOSIS — Z13.29 SCREENING FOR THYROID DISORDER: ICD-10-CM

## 2022-10-27 DIAGNOSIS — Z00.00 WELL ADULT EXAM: Primary | ICD-10-CM

## 2022-10-27 LAB
ALBUMIN SERPL-MCNC: 4 G/DL (ref 3.4–5)
ALBUMIN/GLOB SERPL: 1.3 {RATIO} (ref 1–2)
ALP LIVER SERPL-CCNC: 87 U/L
ALT SERPL-CCNC: 37 U/L
ANION GAP SERPL CALC-SCNC: 7 MMOL/L (ref 0–18)
AST SERPL-CCNC: 20 U/L (ref 15–37)
BILIRUB SERPL-MCNC: 0.9 MG/DL (ref 0.1–2)
BUN BLD-MCNC: 20 MG/DL (ref 7–18)
CALCIUM BLD-MCNC: 9.2 MG/DL (ref 8.5–10.1)
CHLORIDE SERPL-SCNC: 107 MMOL/L (ref 98–112)
CHOLEST SERPL-MCNC: 285 MG/DL (ref ?–200)
CO2 SERPL-SCNC: 24 MMOL/L (ref 21–32)
CREAT BLD-MCNC: 1.33 MG/DL
CREAT UR-SCNC: 211 MG/DL
EST. AVERAGE GLUCOSE BLD GHB EST-MCNC: 171 MG/DL (ref 68–126)
FASTING PATIENT LIPID ANSWER: YES
FASTING STATUS PATIENT QL REPORTED: YES
GFR SERPLBLD BASED ON 1.73 SQ M-ARVRAT: 67 ML/MIN/1.73M2 (ref 60–?)
GLOBULIN PLAS-MCNC: 3.2 G/DL (ref 2.8–4.4)
GLUCOSE BLD-MCNC: 148 MG/DL (ref 70–99)
HBA1C MFR BLD: 7.6 % (ref ?–5.7)
HDLC SERPL-MCNC: 38 MG/DL (ref 40–59)
LDLC SERPL CALC-MCNC: 207 MG/DL (ref ?–100)
MICROALBUMIN UR-MCNC: 1.31 MG/DL
MICROALBUMIN/CREAT 24H UR-RTO: 6.2 UG/MG (ref ?–30)
NONHDLC SERPL-MCNC: 247 MG/DL (ref ?–130)
OSMOLALITY SERPL CALC.SUM OF ELEC: 291 MOSM/KG (ref 275–295)
POTASSIUM SERPL-SCNC: 4 MMOL/L (ref 3.5–5.1)
PROT SERPL-MCNC: 7.2 G/DL (ref 6.4–8.2)
SODIUM SERPL-SCNC: 138 MMOL/L (ref 136–145)
T4 FREE SERPL-MCNC: 1 NG/DL (ref 0.8–1.7)
TRIGL SERPL-MCNC: 204 MG/DL (ref 30–149)
TSI SER-ACNC: 2.07 MIU/ML (ref 0.36–3.74)
VLDLC SERPL CALC-MCNC: 45 MG/DL (ref 0–30)

## 2022-10-27 PROCEDURE — 80053 COMPREHEN METABOLIC PANEL: CPT | Performed by: FAMILY MEDICINE

## 2022-10-27 PROCEDURE — 82570 ASSAY OF URINE CREATININE: CPT | Performed by: FAMILY MEDICINE

## 2022-10-27 PROCEDURE — 83036 HEMOGLOBIN GLYCOSYLATED A1C: CPT | Performed by: FAMILY MEDICINE

## 2022-10-27 PROCEDURE — 3074F SYST BP LT 130 MM HG: CPT | Performed by: FAMILY MEDICINE

## 2022-10-27 PROCEDURE — 82043 UR ALBUMIN QUANTITATIVE: CPT | Performed by: FAMILY MEDICINE

## 2022-10-27 PROCEDURE — 84443 ASSAY THYROID STIM HORMONE: CPT | Performed by: FAMILY MEDICINE

## 2022-10-27 PROCEDURE — 99396 PREV VISIT EST AGE 40-64: CPT | Performed by: FAMILY MEDICINE

## 2022-10-27 PROCEDURE — 80061 LIPID PANEL: CPT | Performed by: FAMILY MEDICINE

## 2022-10-27 PROCEDURE — 3078F DIAST BP <80 MM HG: CPT | Performed by: FAMILY MEDICINE

## 2022-10-27 PROCEDURE — 84439 ASSAY OF FREE THYROXINE: CPT | Performed by: FAMILY MEDICINE

## 2022-10-27 PROCEDURE — 3008F BODY MASS INDEX DOCD: CPT | Performed by: FAMILY MEDICINE

## 2022-10-28 RX ORDER — METFORMIN HYDROCHLORIDE 500 MG/1
500 TABLET, EXTENDED RELEASE ORAL
Qty: 90 TABLET | Refills: 1 | Status: SHIPPED | OUTPATIENT
Start: 2022-10-28

## 2022-10-28 RX ORDER — ATORVASTATIN CALCIUM 20 MG/1
20 TABLET, FILM COATED ORAL NIGHTLY
Qty: 90 TABLET | Refills: 1 | Status: SHIPPED | OUTPATIENT
Start: 2022-10-28

## 2022-10-28 NOTE — TELEPHONE ENCOUNTER
From: Rd Joaquin  To: Yesenia Araujo MD  Sent: 10/27/2022 5:01 PM CDT  Subject: RX Refils    During todays visit, Dr Brian Mosqueda said he was going to send RX to [facility] for Metformin and Atorvastin, but it appears the RX was not ordered. Thanks!

## 2022-11-02 DIAGNOSIS — E11.9 TYPE 2 DIABETES MELLITUS WITHOUT COMPLICATION, WITHOUT LONG-TERM CURRENT USE OF INSULIN (HCC): Primary | ICD-10-CM

## 2022-11-02 DIAGNOSIS — E78.2 MIXED HYPERLIPIDEMIA: ICD-10-CM

## 2022-11-23 ENCOUNTER — TELEPHONE (OUTPATIENT)
Dept: FAMILY MEDICINE CLINIC | Facility: CLINIC | Age: 47
End: 2022-11-23

## 2022-11-23 NOTE — TELEPHONE ENCOUNTER
Agree   If fever or much worse may need urgent care or [facility]    Bu this is appropriate advice

## 2022-11-23 NOTE — TELEPHONE ENCOUNTER
Spoke with wife - pt has had runny nose/post nasal drip/cough for 4 days. Using Expectorant/cough syrup during the day and Nyquil at night. No fever. Encouraged increase in fluids/humidifier/steam shower/saline nasal spray.     Please advise

## 2023-06-30 ENCOUNTER — LABORATORY ENCOUNTER (OUTPATIENT)
Dept: LAB | Age: 48
End: 2023-06-30
Attending: FAMILY MEDICINE
Payer: COMMERCIAL

## 2023-06-30 ENCOUNTER — OFFICE VISIT (OUTPATIENT)
Dept: FAMILY MEDICINE CLINIC | Facility: CLINIC | Age: 48
End: 2023-06-30
Payer: COMMERCIAL

## 2023-06-30 VITALS
WEIGHT: 222 LBS | TEMPERATURE: 99 F | BODY MASS INDEX: 34 KG/M2 | DIASTOLIC BLOOD PRESSURE: 76 MMHG | HEART RATE: 78 BPM | SYSTOLIC BLOOD PRESSURE: 118 MMHG | OXYGEN SATURATION: 97 %

## 2023-06-30 DIAGNOSIS — E78.2 MIXED HYPERLIPIDEMIA: ICD-10-CM

## 2023-06-30 DIAGNOSIS — E11.9 TYPE 2 DIABETES MELLITUS WITHOUT COMPLICATION, WITHOUT LONG-TERM CURRENT USE OF INSULIN (HCC): Primary | ICD-10-CM

## 2023-06-30 DIAGNOSIS — E11.9 TYPE 2 DIABETES MELLITUS WITHOUT COMPLICATION, WITHOUT LONG-TERM CURRENT USE OF INSULIN (HCC): ICD-10-CM

## 2023-06-30 LAB
ALBUMIN SERPL-MCNC: 4 G/DL (ref 3.4–5)
ALBUMIN/GLOB SERPL: 1.3 {RATIO} (ref 1–2)
ALP LIVER SERPL-CCNC: 87 U/L
ALT SERPL-CCNC: 52 U/L
ANION GAP SERPL CALC-SCNC: 0 MMOL/L (ref 0–18)
AST SERPL-CCNC: 27 U/L (ref 15–37)
BILIRUB SERPL-MCNC: 0.7 MG/DL (ref 0.1–2)
BUN BLD-MCNC: 19 MG/DL (ref 7–18)
CALCIUM BLD-MCNC: 9.7 MG/DL (ref 8.5–10.1)
CHLORIDE SERPL-SCNC: 107 MMOL/L (ref 98–112)
CHOLEST SERPL-MCNC: 205 MG/DL (ref ?–200)
CO2 SERPL-SCNC: 31 MMOL/L (ref 21–32)
CREAT BLD-MCNC: 0.95 MG/DL
FASTING PATIENT LIPID ANSWER: NO
FASTING STATUS PATIENT QL REPORTED: NO
GFR SERPLBLD BASED ON 1.73 SQ M-ARVRAT: 100 ML/MIN/1.73M2 (ref 60–?)
GLOBULIN PLAS-MCNC: 3 G/DL (ref 2.8–4.4)
GLUCOSE BLD-MCNC: 171 MG/DL (ref 70–99)
HDLC SERPL-MCNC: 39 MG/DL (ref 40–59)
LDLC SERPL CALC-MCNC: 110 MG/DL (ref ?–100)
NONHDLC SERPL-MCNC: 166 MG/DL (ref ?–130)
OSMOLALITY SERPL CALC.SUM OF ELEC: 292 MOSM/KG (ref 275–295)
POTASSIUM SERPL-SCNC: 3.9 MMOL/L (ref 3.5–5.1)
PROT SERPL-MCNC: 7 G/DL (ref 6.4–8.2)
SODIUM SERPL-SCNC: 138 MMOL/L (ref 136–145)
TRIGL SERPL-MCNC: 323 MG/DL (ref 30–149)
VLDLC SERPL CALC-MCNC: 56 MG/DL (ref 0–30)

## 2023-06-30 PROCEDURE — 83036 HEMOGLOBIN GLYCOSYLATED A1C: CPT | Performed by: FAMILY MEDICINE

## 2023-06-30 PROCEDURE — 80053 COMPREHEN METABOLIC PANEL: CPT | Performed by: FAMILY MEDICINE

## 2023-06-30 PROCEDURE — 3078F DIAST BP <80 MM HG: CPT | Performed by: FAMILY MEDICINE

## 2023-06-30 PROCEDURE — 3074F SYST BP LT 130 MM HG: CPT | Performed by: FAMILY MEDICINE

## 2023-06-30 PROCEDURE — 80061 LIPID PANEL: CPT | Performed by: FAMILY MEDICINE

## 2023-06-30 PROCEDURE — 3052F HG A1C>EQUAL 8.0%<EQUAL 9.0%: CPT | Performed by: FAMILY MEDICINE

## 2023-06-30 PROCEDURE — 99214 OFFICE O/P EST MOD 30 MIN: CPT | Performed by: FAMILY MEDICINE

## 2023-06-30 RX ORDER — ATORVASTATIN CALCIUM 20 MG/1
20 TABLET, FILM COATED ORAL NIGHTLY
Qty: 90 TABLET | Refills: 1 | Status: SHIPPED | OUTPATIENT
Start: 2023-06-30

## 2023-07-01 LAB
EST. AVERAGE GLUCOSE BLD GHB EST-MCNC: 212 MG/DL (ref 68–126)
HBA1C MFR BLD: 9 % (ref ?–5.7)

## 2023-07-10 DIAGNOSIS — E11.9 TYPE 2 DIABETES MELLITUS WITHOUT COMPLICATION, WITHOUT LONG-TERM CURRENT USE OF INSULIN (HCC): Primary | ICD-10-CM

## 2023-07-10 DIAGNOSIS — E78.2 MIXED HYPERLIPIDEMIA: ICD-10-CM

## 2023-09-03 DIAGNOSIS — E11.9 TYPE 2 DIABETES MELLITUS WITHOUT COMPLICATION, WITHOUT LONG-TERM CURRENT USE OF INSULIN (HCC): ICD-10-CM

## 2023-09-05 RX ORDER — SITAGLIPTIN 100 MG/1
100 TABLET, FILM COATED ORAL DAILY
Qty: 30 TABLET | Refills: 1 | Status: SHIPPED | OUTPATIENT
Start: 2023-09-05 | End: 2023-09-11

## 2023-09-05 NOTE — TELEPHONE ENCOUNTER
Patient outreach : called pt to notify him his due next month for HgA1c and cmp blood work          LOV: 6-30-23  LAST LAB:6-30-23  LAST RX:    Medication Quantity Refills Start End   SITagliptin Phosphate 100 MG Oral Tab 30 tablet 1 6/30/2023 9/28/2023   Sig:   Take 1 tablet (100 mg total) by mouth daily. Next OV: No future appointments.    PROTOCOL    Diabetic Medication Protocol [id number]/03/2023 08:32 PM   Protocol Details Last HgBA1C < 7.5    HgBA1C procedure resulted in past 6 months    Microalbumin procedure in past 12 months or taking ACE/ARB    Appointment in past 6 or next 3 months

## 2023-10-11 ENCOUNTER — PATIENT MESSAGE (OUTPATIENT)
Dept: FAMILY MEDICINE CLINIC | Facility: CLINIC | Age: 48
End: 2023-10-11

## 2023-10-11 DIAGNOSIS — Z12.83 SCREENING EXAM FOR SKIN CANCER: Primary | ICD-10-CM

## 2023-10-12 NOTE — TELEPHONE ENCOUNTER
From: Malvin Rees  To: Bobo Calvo  Sent: 10/11/2023 3:45 PM CDT  Subject: Dermatologist referral     Good afternoon,   Please put in a dermatologist referral for Padmini Hutchinson. To my knowledge he has not seen a dermatologist in several years. I would like him to get a whole body check, as well as check a specific skin abnormality. I Scooby Molina) have previously gone to [facility] in [place]. Unless you have a preference on who he sees please put the referral in for [facility]. Thank you!     Scooby Molina

## 2023-12-28 ENCOUNTER — OFFICE VISIT (OUTPATIENT)
Dept: FAMILY MEDICINE CLINIC | Facility: CLINIC | Age: 48
End: 2023-12-28
Payer: COMMERCIAL

## 2023-12-28 ENCOUNTER — LABORATORY ENCOUNTER (OUTPATIENT)
Dept: LAB | Age: 48
End: 2023-12-28
Attending: FAMILY MEDICINE
Payer: COMMERCIAL

## 2023-12-28 VITALS
HEART RATE: 78 BPM | TEMPERATURE: 98 F | OXYGEN SATURATION: 99 % | BODY MASS INDEX: 33.73 KG/M2 | RESPIRATION RATE: 12 BRPM | HEIGHT: 67.75 IN | DIASTOLIC BLOOD PRESSURE: 70 MMHG | WEIGHT: 220 LBS | SYSTOLIC BLOOD PRESSURE: 110 MMHG

## 2023-12-28 DIAGNOSIS — Z13.29 SCREENING FOR THYROID DISORDER: ICD-10-CM

## 2023-12-28 DIAGNOSIS — E66.9 OBESITY (BMI 30.0-34.9): ICD-10-CM

## 2023-12-28 DIAGNOSIS — R73.9 HYPERGLYCEMIA: ICD-10-CM

## 2023-12-28 DIAGNOSIS — Z79.899 ENCOUNTER FOR LONG-TERM (CURRENT) USE OF MEDICATIONS: Primary | ICD-10-CM

## 2023-12-28 DIAGNOSIS — E78.2 MIXED HYPERLIPIDEMIA: ICD-10-CM

## 2023-12-28 DIAGNOSIS — Z28.21 REFUSED INFLUENZA VACCINE: ICD-10-CM

## 2023-12-28 DIAGNOSIS — Z12.11 COLON CANCER SCREENING: ICD-10-CM

## 2023-12-28 DIAGNOSIS — E11.9 TYPE 2 DIABETES MELLITUS WITHOUT COMPLICATION, WITHOUT LONG-TERM CURRENT USE OF INSULIN (HCC): ICD-10-CM

## 2023-12-28 LAB
ALBUMIN SERPL-MCNC: 4.1 G/DL (ref 3.4–5)
ALBUMIN/GLOB SERPL: 1.5 {RATIO} (ref 1–2)
ALP LIVER SERPL-CCNC: 90 U/L
ALT SERPL-CCNC: 36 U/L
ANION GAP SERPL CALC-SCNC: 4 MMOL/L (ref 0–18)
AST SERPL-CCNC: 17 U/L (ref 15–37)
BILIRUB SERPL-MCNC: 1 MG/DL (ref 0.1–2)
BUN BLD-MCNC: 14 MG/DL (ref 9–23)
CALCIUM BLD-MCNC: 9.5 MG/DL (ref 8.5–10.1)
CHLORIDE SERPL-SCNC: 103 MMOL/L (ref 98–112)
CHOLEST SERPL-MCNC: 255 MG/DL (ref ?–200)
CO2 SERPL-SCNC: 29 MMOL/L (ref 21–32)
CREAT BLD-MCNC: 1.17 MG/DL
CREAT UR-SCNC: 218 MG/DL
EGFRCR SERPLBLD CKD-EPI 2021: 78 ML/MIN/1.73M2 (ref 60–?)
EST. AVERAGE GLUCOSE BLD GHB EST-MCNC: 186 MG/DL (ref 68–126)
FASTING PATIENT LIPID ANSWER: YES
FASTING STATUS PATIENT QL REPORTED: YES
GLOBULIN PLAS-MCNC: 2.8 G/DL (ref 2.8–4.4)
GLUCOSE BLD-MCNC: 176 MG/DL (ref 70–99)
HBA1C MFR BLD: 8.1 % (ref ?–5.7)
HDLC SERPL-MCNC: 40 MG/DL (ref 40–59)
LDLC SERPL CALC-MCNC: 174 MG/DL (ref ?–100)
MICROALBUMIN UR-MCNC: 2.95 MG/DL
MICROALBUMIN/CREAT 24H UR-RTO: 13.5 UG/MG (ref ?–30)
NONHDLC SERPL-MCNC: 215 MG/DL (ref ?–130)
OSMOLALITY SERPL CALC.SUM OF ELEC: 287 MOSM/KG (ref 275–295)
POTASSIUM SERPL-SCNC: 4.3 MMOL/L (ref 3.5–5.1)
PROT SERPL-MCNC: 6.9 G/DL (ref 6.4–8.2)
SODIUM SERPL-SCNC: 136 MMOL/L (ref 136–145)
TRIGL SERPL-MCNC: 220 MG/DL (ref 30–149)
TSI SER-ACNC: 2.05 MIU/ML (ref 0.36–3.74)
VLDLC SERPL CALC-MCNC: 45 MG/DL (ref 0–30)

## 2023-12-28 PROCEDURE — 82043 UR ALBUMIN QUANTITATIVE: CPT | Performed by: FAMILY MEDICINE

## 2023-12-28 PROCEDURE — 99214 OFFICE O/P EST MOD 30 MIN: CPT | Performed by: FAMILY MEDICINE

## 2023-12-28 PROCEDURE — 3008F BODY MASS INDEX DOCD: CPT | Performed by: FAMILY MEDICINE

## 2023-12-28 PROCEDURE — 84443 ASSAY THYROID STIM HORMONE: CPT | Performed by: FAMILY MEDICINE

## 2023-12-28 PROCEDURE — 82570 ASSAY OF URINE CREATININE: CPT | Performed by: FAMILY MEDICINE

## 2023-12-28 PROCEDURE — 80053 COMPREHEN METABOLIC PANEL: CPT | Performed by: FAMILY MEDICINE

## 2023-12-28 PROCEDURE — 80061 LIPID PANEL: CPT | Performed by: FAMILY MEDICINE

## 2023-12-28 PROCEDURE — 3078F DIAST BP <80 MM HG: CPT | Performed by: FAMILY MEDICINE

## 2023-12-28 PROCEDURE — 3074F SYST BP LT 130 MM HG: CPT | Performed by: FAMILY MEDICINE

## 2023-12-28 PROCEDURE — 83036 HEMOGLOBIN GLYCOSYLATED A1C: CPT | Performed by: FAMILY MEDICINE

## 2023-12-28 RX ORDER — ATORVASTATIN CALCIUM 20 MG/1
20 TABLET, FILM COATED ORAL NIGHTLY
Qty: 90 TABLET | Refills: 1 | Status: SHIPPED | OUTPATIENT
Start: 2023-12-28

## 2024-01-03 ENCOUNTER — OFFICE VISIT (OUTPATIENT)
Dept: FAMILY MEDICINE CLINIC | Facility: CLINIC | Age: 49
End: 2024-01-03
Payer: COMMERCIAL

## 2024-01-03 VITALS
OXYGEN SATURATION: 99 % | SYSTOLIC BLOOD PRESSURE: 120 MMHG | BODY MASS INDEX: 33 KG/M2 | TEMPERATURE: 98 F | WEIGHT: 217 LBS | DIASTOLIC BLOOD PRESSURE: 84 MMHG | RESPIRATION RATE: 12 BRPM | HEART RATE: 73 BPM

## 2024-01-03 DIAGNOSIS — E78.2 MIXED HYPERLIPIDEMIA: ICD-10-CM

## 2024-01-03 DIAGNOSIS — J02.0 STREP THROAT: Primary | ICD-10-CM

## 2024-01-03 DIAGNOSIS — Z20.818 EXPOSURE TO STREP THROAT: ICD-10-CM

## 2024-01-03 DIAGNOSIS — E11.9 TYPE 2 DIABETES MELLITUS WITHOUT COMPLICATION, WITHOUT LONG-TERM CURRENT USE OF INSULIN (HCC): ICD-10-CM

## 2024-01-03 DIAGNOSIS — H10.32 ACUTE BACTERIAL CONJUNCTIVITIS OF LEFT EYE: ICD-10-CM

## 2024-01-03 LAB
CONTROL LINE PRESENT WITH A CLEAR BACKGROUND (YES/NO): YES YES/NO
KIT LOT #: 5681 NUMERIC
STREP GRP A CUL-SCR: POSITIVE

## 2024-01-03 PROCEDURE — 3074F SYST BP LT 130 MM HG: CPT | Performed by: FAMILY MEDICINE

## 2024-01-03 PROCEDURE — 3079F DIAST BP 80-89 MM HG: CPT | Performed by: FAMILY MEDICINE

## 2024-01-03 PROCEDURE — 99214 OFFICE O/P EST MOD 30 MIN: CPT | Performed by: FAMILY MEDICINE

## 2024-01-03 PROCEDURE — 87880 STREP A ASSAY W/OPTIC: CPT | Performed by: FAMILY MEDICINE

## 2024-01-03 RX ORDER — TOBRAMYCIN 3 MG/ML
1 SOLUTION/ DROPS OPHTHALMIC EVERY 4 HOURS
Qty: 5 ML | Refills: 0 | Status: SHIPPED | OUTPATIENT
Start: 2024-01-03 | End: 2024-01-08

## 2024-01-03 RX ORDER — AMOXICILLIN 875 MG/1
875 TABLET, COATED ORAL 2 TIMES DAILY
Qty: 20 TABLET | Refills: 0 | Status: SHIPPED | OUTPATIENT
Start: 2024-01-03

## 2024-01-03 NOTE — PROGRESS NOTES
Subjective:   William Swift is a 48 year old male who presents for Sore Throat     Eys crusted  Strep exposure  Fever:     Yes []     No [x]  but felt warm  with night sweats      Fatigue: Yes []     No [x]   Nasal congestion : Yes [x]     No []    minor  Sinus pressure: Yes [x]     No [x]      Sore throat:   Yes [x]      No []      Ear Pain:  Yes []      No [x]        Cough:    Yes [x]     No []    Slight              Dry [x]     Productive []   Shortness of breath:  Yes []   No [x]     Wheezing:   Yes []   No [x]      GI symptoms: Yes []     No [x]                     Nausea  []; Vomiting  []; Diarrhea  [] ; Upset stomach [];   Abdominal Pain  []      History/Other:   has a current medication list which includes the following prescription(s): amoxicillin, tobramycin, atorvastatin, and sitagliptin phosphate.     has No Known Allergies.     Review of Systems:  GENERAL HEALTH: feels well no complaints  SKIN: denies any unusual skin lesions or rashes  ENT  see HPI   Eye see HPI   RESPIRATORY: denies shortness of breath with exertion  CARDIOVASCULAR: denies chest pain on exertion  GI: denies abdominal pain and denies heartburn  NEURO: denies headaches    Objective:   /84 (BP Location: Right arm, Patient Position: Sitting, Cuff Size: large)   Pulse 73   Temp 98.4 °F (36.9 °C) (Temporal)   Resp 12   Wt 217 lb (98.4 kg)   SpO2 99%   BMI 33.24 kg/m²  Estimated body mass index is 33.24 kg/m² as calculated from the following:    Height as of 12/28/23: 5' 7.75\" (1.721 m).    Weight as of this encounter: 217 lb (98.4 kg).  GENERAL: well developed, well nourished,in mild discomfort    SKIN: no rashes,no suspicious lesions  HEENT: atraumatic, normocephalic,ears  are clear  Eye drainage noted   Left conjunctiva red   and throat cannot see secondary to gag reflex    NECK: supple,no adenopathy,no bruits  LUNGS: clear to auscultation  CARDIO: RRR without murmur  Results for orders placed or performed in visit on 01/03/24    Rapid Strep    Collection Time: 01/03/24  5:18 PM   Result Value Ref Range    Strep Grp A Screen positive Negative    Control Line Present with a clear background (yes/no) yes Yes/No    Kit Lot # [id number] Numeric    Kit Expiration Date 10/27/24 Date         Assessment & Plan:   1. Strep throat (Primary)  -     Strep A Assay W/Optic  -     Amoxicillin; Take 1 tablet (875 mg total) by mouth 2 (two) times daily.  Dispense: 20 tablet; Refill: 0  2. Exposure to strep throat  -     Strep A Assay W/Optic  3. Acute bacterial conjunctivitis of left eye  -     Tobramycin; Place 1 drop into the left eye every 4 (four) hours for 5 days. affected eye  Dispense: 5 mL; Refill: 0  4. Mixed hyperlipidemia  Comments:  will watch the lipids and no specific meds needed for nw  may be carnivore diet  5. Type 2 diabetes mellitus without complication, without long-term current use of insulin (HCC)  Comments:  a1c better at 8.1 thean prior 9          Sesar Calvo MD, 1/3/2024, 5:00 PM

## 2024-01-18 ENCOUNTER — TELEPHONE (OUTPATIENT)
Dept: FAMILY MEDICINE CLINIC | Facility: CLINIC | Age: 49
End: 2024-01-18

## 2024-02-02 LAB — AMB EXT COLOGUARD RESULT: NEGATIVE

## 2024-02-06 ENCOUNTER — MED REC SCAN ONLY (OUTPATIENT)
Dept: FAMILY MEDICINE CLINIC | Facility: CLINIC | Age: 49
End: 2024-02-06

## 2024-02-06 ENCOUNTER — TELEPHONE (OUTPATIENT)
Dept: FAMILY MEDICINE CLINIC | Facility: CLINIC | Age: 49
End: 2024-02-06

## 2024-02-06 NOTE — TELEPHONE ENCOUNTER
Received fax from [facility] with Cologuard result done on 1-23-24    Dr Calvo carefully reviewed and documented:  Negative check 3 years  Epic was updated copy to scan    Patient notified and verbalized understanding.

## 2024-06-18 ENCOUNTER — LAB ENCOUNTER (OUTPATIENT)
Dept: LAB | Age: 49
End: 2024-06-18
Attending: FAMILY MEDICINE

## 2024-06-18 ENCOUNTER — OFFICE VISIT (OUTPATIENT)
Dept: FAMILY MEDICINE CLINIC | Facility: CLINIC | Age: 49
End: 2024-06-18

## 2024-06-18 VITALS
OXYGEN SATURATION: 98 % | HEART RATE: 67 BPM | BODY MASS INDEX: 34.09 KG/M2 | WEIGHT: 217.19 LBS | SYSTOLIC BLOOD PRESSURE: 122 MMHG | DIASTOLIC BLOOD PRESSURE: 78 MMHG | TEMPERATURE: 97 F | HEIGHT: 67 IN | RESPIRATION RATE: 18 BRPM

## 2024-06-18 DIAGNOSIS — E11.65 UNCONTROLLED TYPE 2 DIABETES MELLITUS WITH HYPERGLYCEMIA (HCC): ICD-10-CM

## 2024-06-18 DIAGNOSIS — E78.00 HYPERCHOLESTEROLEMIA: ICD-10-CM

## 2024-06-18 DIAGNOSIS — M35.3 POLYMYALGIA (HCC): ICD-10-CM

## 2024-06-18 DIAGNOSIS — M35.3 POLYMYALGIA (HCC): Primary | ICD-10-CM

## 2024-06-18 LAB
ALBUMIN SERPL-MCNC: 4.2 G/DL (ref 3.4–5)
ALBUMIN/GLOB SERPL: 1.4 {RATIO} (ref 1–2)
ALP LIVER SERPL-CCNC: 100 U/L
ALT SERPL-CCNC: 40 U/L
ANION GAP SERPL CALC-SCNC: 10 MMOL/L (ref 0–18)
AST SERPL-CCNC: 21 U/L (ref 15–37)
BILIRUB SERPL-MCNC: 0.7 MG/DL (ref 0.1–2)
BUN BLD-MCNC: 16 MG/DL (ref 9–23)
CALCIUM BLD-MCNC: 9.4 MG/DL (ref 8.5–10.1)
CHLORIDE SERPL-SCNC: 103 MMOL/L (ref 98–112)
CHOLEST SERPL-MCNC: 177 MG/DL (ref ?–200)
CK SERPL-CCNC: 115 U/L
CO2 SERPL-SCNC: 24 MMOL/L (ref 21–32)
CREAT BLD-MCNC: 1.33 MG/DL
CRP SERPL-MCNC: <0.29 MG/DL (ref ?–0.3)
EGFRCR SERPLBLD CKD-EPI 2021: 66 ML/MIN/1.73M2 (ref 60–?)
ERYTHROCYTE [SEDIMENTATION RATE] IN BLOOD: 6 MM/HR
FASTING PATIENT LIPID ANSWER: NO
FASTING STATUS PATIENT QL REPORTED: NO
GLOBULIN PLAS-MCNC: 3.1 G/DL (ref 2.8–4.4)
GLUCOSE BLD-MCNC: 192 MG/DL (ref 70–99)
HDLC SERPL-MCNC: 33 MG/DL (ref 40–59)
LDLC SERPL CALC-MCNC: 70 MG/DL (ref ?–100)
NONHDLC SERPL-MCNC: 144 MG/DL (ref ?–130)
OSMOLALITY SERPL CALC.SUM OF ELEC: 290 MOSM/KG (ref 275–295)
POTASSIUM SERPL-SCNC: 4.3 MMOL/L (ref 3.5–5.1)
PROT SERPL-MCNC: 7.3 G/DL (ref 6.4–8.2)
SODIUM SERPL-SCNC: 137 MMOL/L (ref 136–145)
TRIGL SERPL-MCNC: 476 MG/DL (ref 30–149)
VLDLC SERPL CALC-MCNC: 74 MG/DL (ref 0–30)

## 2024-06-18 PROCEDURE — 82550 ASSAY OF CK (CPK): CPT | Performed by: FAMILY MEDICINE

## 2024-06-18 PROCEDURE — 80053 COMPREHEN METABOLIC PANEL: CPT | Performed by: FAMILY MEDICINE

## 2024-06-18 PROCEDURE — 99214 OFFICE O/P EST MOD 30 MIN: CPT | Performed by: FAMILY MEDICINE

## 2024-06-18 PROCEDURE — 85652 RBC SED RATE AUTOMATED: CPT | Performed by: FAMILY MEDICINE

## 2024-06-18 PROCEDURE — 80061 LIPID PANEL: CPT | Performed by: FAMILY MEDICINE

## 2024-06-18 PROCEDURE — 3078F DIAST BP <80 MM HG: CPT | Performed by: FAMILY MEDICINE

## 2024-06-18 PROCEDURE — 3074F SYST BP LT 130 MM HG: CPT | Performed by: FAMILY MEDICINE

## 2024-06-18 PROCEDURE — 83036 HEMOGLOBIN GLYCOSYLATED A1C: CPT | Performed by: FAMILY MEDICINE

## 2024-06-18 PROCEDURE — 86140 C-REACTIVE PROTEIN: CPT | Performed by: FAMILY MEDICINE

## 2024-06-18 PROCEDURE — 3008F BODY MASS INDEX DOCD: CPT | Performed by: FAMILY MEDICINE

## 2024-06-18 RX ORDER — CYCLOBENZAPRINE HCL 10 MG
10 TABLET ORAL NIGHTLY
Qty: 5 TABLET | Refills: 0 | Status: SHIPPED | OUTPATIENT
Start: 2024-06-18

## 2024-06-18 NOTE — PATIENT INSTRUCTIONS
I discussed possible causes for patient's musculoskeletal symptoms.  Possible statin reaction.  Have asked him to discontinue his atorvastatin for now, update symptoms after 1 to 2 weeks, if no change, resume atorvastatin, if symptoms resolve, then substitute rosuvastatin  Advised patient I am concerned that his urinary frequency is due to uncontrolled diabetes.  I discussed the endorgan complications of poorly controlled diabetes  Will check labs including lipids, A1c, CMP, CRP, CK, sed rate  Recommend moist heat, upper back stretches, soft tissue massage  May use cyclobenzaprine 10 mg nightly, #5  Further recommendations pending results

## 2024-06-18 NOTE — PROGRESS NOTES
William Swift is a 49 year old male.  Chief Complaint   Patient presents with    Back Pain     The last 3 or 4 days having back pain     Arm Pain     Pain in both arms that starts in the shoulders and radiates down for last 3 months or so    Here w/ wife  HPI:   C/o pain in both arms from shoulders to forearms x several months, off and on  Upper back pain x several days, tried heat ans ice, ibuprofen, no trauma or new activities  Pain is worse laying down  Lots of sitting  Patient does not check his blood sugar.  Tends to drink sugary sodas  Current Outpatient Medications   Medication Sig Dispense Refill    atorvastatin 20 MG Oral Tab Take 1 tablet (20 mg total) by mouth nightly. 90 tablet 1    SITagliptin Phosphate (JANUVIA) 100 MG Oral Tab Take 1 tablet (100 mg total) by mouth daily. 90 tablet 1      No past medical history on file.   Social History:  Social History     Socioeconomic History    Marital status:    Tobacco Use    Smoking status: Never    Smokeless tobacco: Never   Vaping Use    Vaping status: Never Used   Substance and Sexual Activity    Alcohol use: No    Drug use: No        REVIEW OF SYSTEMS:   GENERAL HEALTH: feels well otherwise, + fatigue, appetite ok  SKIN: denies any unusual skin lesions or rashes  ENT: denies nasal congestion, pnd, sore throat, ear pain or pressure, decreased hearing  RESPIRATORY: denies shortness of breath with exertion,cough, wheezing  CARDIOVASCULAR: denies chest pain on exertion, edema  GI: denies abdominal pain and denies heartburn  : Voiding every 1-2 hours  NEURO: denies headaches  PSYCH: denies feeling stressed or anxious  MSK: See HPI, tightness across shoulder blades, discomfort both upper arms to forearm, no pain,, numbness or weakness, no lower back pain  EXAM:   /78 (BP Location: Left arm, Patient Position: Sitting, Cuff Size: large)   Pulse 67   Temp 97 °F (36.1 °C) (Temporal)   Resp 18   Ht 5' 7\" (1.702 m)   Wt 217 lb 3.2 oz (98.5 kg)    SpO2 98%   BMI 34.02 kg/m²   GENERAL: well developed, well nourished,in no apparent distress, well hydrated  SKIN: no rashes,no suspicious lesions  ENT: TMs: intact, good mobility, Nose: turbinates clear, no dc, Throat: no erythema, pnd, or lesions  NECK: supple,no adenopathy,no bruits, no thyromegaly  LUNGS: clear to auscultation, no w/r/r  CARDIO: RRR without murmur, peripheral pulses intact  GI: good BS's,no masses, HSM or tenderness  EXTREMITIES: FROM of all joints  MSK: Spine flexion fingers to toes, no pain with extension past neutral spine or with lateral flexion, tightness across the upper back, between shoulder blades  Bilateral shoulders with full range of motion, no pain to palpation of upper extremities, elbows, wrists, hands with full range of motion without pain or discomfort, good distal pulses,  Neuro: Alert and oriented x 3, cranial nerves II through XII, DTR symmetrical, no gross motor or sensory deficits noted upper and lower extremities bilaterally, negative straight leg raising  ASSESSMENT AND PLAN:     Encounter Diagnoses   Name Primary?    Polymyalgia (HCC) Yes    Uncontrolled type 2 diabetes mellitus with hyperglycemia (HCC)     Hypercholesterolemia      Orders Placed This Encounter   Procedures    Lipid Panel [E]    C-Reactive Protein [E]    Hemoglobin A1C [E]    Sed Rate, Westergren (Automated) [E]    CK (Creatine Kinase) (Not Creatinine) (E)    Comp Metabolic Panel (14) [E]     Meds & Refills for this Visit:  Requested Prescriptions     Signed Prescriptions Disp Refills    cyclobenzaprine 10 MG Oral Tab 5 tablet 0     Sig: Take 1 tablet (10 mg total) by mouth nightly.     Imaging & Consults:  None  No follow-ups on file.  Patient Instructions   I discussed possible causes for patient's musculoskeletal symptoms.  Possible statin reaction.  Have asked him to discontinue his atorvastatin for now, update symptoms after 1 to 2 weeks, if no change, resume atorvastatin, if symptoms resolve, then  substitute rosuvastatin  Advised patient I am concerned that his urinary frequency is due to uncontrolled diabetes.  I discussed the endorgan complications of poorly controlled diabetes  Will check labs including lipids, A1c, CMP, CRP, CK, sed rate  Recommend moist heat, upper back stretches, soft tissue massage  May use cyclobenzaprine 10 mg nightly, #5  Further recommendations pending results  The patient indicates understanding of these issues and agrees to the plan.    Francesco Gonzalez, , FAAFP

## 2024-06-19 LAB
EST. AVERAGE GLUCOSE BLD GHB EST-MCNC: 200 MG/DL (ref 68–126)
HBA1C MFR BLD: 8.6 % (ref ?–5.7)

## 2024-06-20 ENCOUNTER — PATIENT MESSAGE (OUTPATIENT)
Dept: FAMILY MEDICINE CLINIC | Facility: CLINIC | Age: 49
End: 2024-06-20

## 2024-06-20 DIAGNOSIS — E78.00 HYPERCHOLESTEROLEMIA: ICD-10-CM

## 2024-06-20 DIAGNOSIS — E11.65 UNCONTROLLED TYPE 2 DIABETES MELLITUS WITH HYPERGLYCEMIA (HCC): Primary | ICD-10-CM

## 2024-06-20 RX ORDER — REPAGLINIDE 0.5 MG/1
0.5 TABLET ORAL
Qty: 90 TABLET | Refills: 0 | Status: SHIPPED | OUTPATIENT
Start: 2024-06-20 | End: 2024-06-20

## 2024-06-20 RX ORDER — REPAGLINIDE 0.5 MG/1
0.5 TABLET ORAL
Qty: 90 TABLET | Refills: 2 | Status: SHIPPED | OUTPATIENT
Start: 2024-06-20

## 2024-06-20 NOTE — TELEPHONE ENCOUNTER
From: William Swift  To: Francesco Gonzalez  Sent: 6/20/2024 12:17 PM CDT  Subject: RX for REPAGLINIDE    Spoke with the nurse this morning and she said the RX for REPAGLINIDE was for 90 days so I told her to send it to [facility] instead of [email or web address] because that is required by my insurance. I am now getting an email from [facility] saying the RX is for less than a month so they will not fill it. Please re-send to [email or web address] if it is only a months supply. Thanks!

## 2024-06-20 NOTE — TELEPHONE ENCOUNTER
See prior note, patient states that [facility] must have an order for 90 days, the original order was for 30 days. May I proceed?

## 2024-06-25 ENCOUNTER — TELEPHONE (OUTPATIENT)
Dept: FAMILY MEDICINE CLINIC | Facility: CLINIC | Age: 49
End: 2024-06-25

## 2024-06-25 RX ORDER — REPAGLINIDE 0.5 MG/1
0.5 TABLET ORAL
Qty: 270 TABLET | Refills: 0 | Status: SHIPPED | OUTPATIENT
Start: 2024-06-25

## 2024-06-25 NOTE — TELEPHONE ENCOUNTER
Patient calling to clarify refill on Repaglinide 0.5 mg  should be for 30 days or 90 days.    30 days is to [facility] [place]    90 days is to [facility].      Reviewed with Dr Gonzalez who advised can send 90 day supply no refill    Prescription sent to [facility] and reviewed with patient.

## 2024-07-10 ENCOUNTER — PATIENT MESSAGE (OUTPATIENT)
Dept: FAMILY MEDICINE CLINIC | Facility: CLINIC | Age: 49
End: 2024-07-10

## 2024-07-11 NOTE — TELEPHONE ENCOUNTER
From: William Swift  To: Sesar Calvo  Sent: 7/10/2024 9:12 PM CDT  Subject: Medication issue    I saw Dr Gonzalez as you were not available. I was experiencing back, shoulder and arm pain. I also mentioned how I urinate frequently and it disrupts my sleep and I do not get quality sleep. Dr Gonzalez stated the frequent urination was due to the diabetes not being in control and he added a prescription for Repaglinide for 3 x’s per day with meals and Cyclobenzaprine x 1 per day for 5 days. He also told me to stop taking Atorvastaton for 2 weeks. Almost immediately the pain went away and so did the frequent urination. The pain did not return when the Clclobenzaprine ran out. A few days ago I started the Atorvastaton back up and immediately I started having frequent urination again and yesterday I started to have pain again in my upper back/shoulders. I feel these side effects are due to the Atorvastaton. Is there an alternative medication I can be prescribed in place of the Atorvastaton?

## 2024-07-12 RX ORDER — ROSUVASTATIN CALCIUM 10 MG/1
10 TABLET, COATED ORAL NIGHTLY
Qty: 90 TABLET | Refills: 1 | Status: SHIPPED | OUTPATIENT
Start: 2024-07-12 | End: 2025-01-08

## 2024-09-10 ENCOUNTER — TELEPHONE (OUTPATIENT)
Dept: FAMILY MEDICINE CLINIC | Facility: CLINIC | Age: 49
End: 2024-09-10

## 2024-09-10 NOTE — TELEPHONE ENCOUNTER
Patient outreach : left message o identified voicemall stating pt is due for annual physical and fasting labs with

## 2025-01-07 ENCOUNTER — OFFICE VISIT (OUTPATIENT)
Dept: FAMILY MEDICINE CLINIC | Facility: CLINIC | Age: 50
End: 2025-01-07
Payer: COMMERCIAL

## 2025-01-07 VITALS
HEIGHT: 68 IN | SYSTOLIC BLOOD PRESSURE: 123 MMHG | WEIGHT: 214 LBS | RESPIRATION RATE: 12 BRPM | OXYGEN SATURATION: 98 % | HEART RATE: 71 BPM | BODY MASS INDEX: 32.43 KG/M2 | DIASTOLIC BLOOD PRESSURE: 86 MMHG | TEMPERATURE: 97 F

## 2025-01-07 DIAGNOSIS — E78.00 HYPERCHOLESTEROLEMIA: ICD-10-CM

## 2025-01-07 DIAGNOSIS — E11.65 UNCONTROLLED TYPE 2 DIABETES MELLITUS WITH HYPERGLYCEMIA (HCC): ICD-10-CM

## 2025-01-07 DIAGNOSIS — Z79.899 ENCOUNTER FOR LONG-TERM (CURRENT) USE OF MEDICATIONS: Primary | ICD-10-CM

## 2025-01-07 DIAGNOSIS — E11.9 TYPE 2 DIABETES MELLITUS WITHOUT COMPLICATION, WITHOUT LONG-TERM CURRENT USE OF INSULIN (HCC): ICD-10-CM

## 2025-01-07 LAB
ALBUMIN SERPL-MCNC: 4.9 G/DL (ref 3.2–4.8)
ALBUMIN/GLOB SERPL: 2 {RATIO} (ref 1–2)
ALP LIVER SERPL-CCNC: 108 U/L
ALT SERPL-CCNC: 32 U/L
ANION GAP SERPL CALC-SCNC: 8 MMOL/L (ref 0–18)
AST SERPL-CCNC: 23 U/L (ref ?–34)
BILIRUB SERPL-MCNC: 0.8 MG/DL (ref 0.3–1.2)
BUN BLD-MCNC: 11 MG/DL (ref 9–23)
CALCIUM BLD-MCNC: 10.2 MG/DL (ref 8.7–10.4)
CHLORIDE SERPL-SCNC: 101 MMOL/L (ref 98–112)
CHOLEST SERPL-MCNC: 303 MG/DL (ref ?–200)
CO2 SERPL-SCNC: 27 MMOL/L (ref 21–32)
CREAT BLD-MCNC: 1.22 MG/DL
CREAT UR-SCNC: 184 MG/DL
EGFRCR SERPLBLD CKD-EPI 2021: 74 ML/MIN/1.73M2 (ref 60–?)
EST. AVERAGE GLUCOSE BLD GHB EST-MCNC: 258 MG/DL (ref 68–126)
GLOBULIN PLAS-MCNC: 2.5 G/DL (ref 2–3.5)
GLUCOSE BLD-MCNC: 293 MG/DL (ref 70–99)
HBA1C MFR BLD: 10.6 % (ref ?–5.7)
HDLC SERPL-MCNC: 38 MG/DL (ref 40–59)
LDLC SERPL CALC-MCNC: 195 MG/DL (ref ?–100)
MICROALBUMIN UR-MCNC: 1.9 MG/DL
MICROALBUMIN/CREAT 24H UR-RTO: 10.3 UG/MG (ref ?–30)
NONHDLC SERPL-MCNC: 265 MG/DL (ref ?–130)
OSMOLALITY SERPL CALC.SUM OF ELEC: 292 MOSM/KG (ref 275–295)
POTASSIUM SERPL-SCNC: 4.2 MMOL/L (ref 3.5–5.1)
PROT SERPL-MCNC: 7.4 G/DL (ref 5.7–8.2)
SODIUM SERPL-SCNC: 136 MMOL/L (ref 136–145)
TRIGL SERPL-MCNC: 347 MG/DL (ref 30–149)
VLDLC SERPL CALC-MCNC: 76 MG/DL (ref 0–30)

## 2025-01-07 PROCEDURE — 80053 COMPREHEN METABOLIC PANEL: CPT | Performed by: FAMILY MEDICINE

## 2025-01-07 PROCEDURE — 99214 OFFICE O/P EST MOD 30 MIN: CPT | Performed by: FAMILY MEDICINE

## 2025-01-07 PROCEDURE — 3074F SYST BP LT 130 MM HG: CPT | Performed by: FAMILY MEDICINE

## 2025-01-07 PROCEDURE — 83036 HEMOGLOBIN GLYCOSYLATED A1C: CPT | Performed by: FAMILY MEDICINE

## 2025-01-07 PROCEDURE — 82570 ASSAY OF URINE CREATININE: CPT | Performed by: FAMILY MEDICINE

## 2025-01-07 PROCEDURE — 3079F DIAST BP 80-89 MM HG: CPT | Performed by: FAMILY MEDICINE

## 2025-01-07 PROCEDURE — 82043 UR ALBUMIN QUANTITATIVE: CPT | Performed by: FAMILY MEDICINE

## 2025-01-07 PROCEDURE — G2211 COMPLEX E/M VISIT ADD ON: HCPCS | Performed by: FAMILY MEDICINE

## 2025-01-07 PROCEDURE — 3008F BODY MASS INDEX DOCD: CPT | Performed by: FAMILY MEDICINE

## 2025-01-07 PROCEDURE — 80061 LIPID PANEL: CPT | Performed by: FAMILY MEDICINE

## 2025-01-07 RX ORDER — ROSUVASTATIN CALCIUM 10 MG/1
10 TABLET, COATED ORAL NIGHTLY
Qty: 90 TABLET | Refills: 1 | Status: SHIPPED | OUTPATIENT
Start: 2025-01-07 | End: 2025-01-13

## 2025-01-07 NOTE — PROGRESS NOTES
Subjective:   William Swift is a 49 year old male who presents for Medication Follow-Up     Here for follow up  Off all medications for a month  Busy at work  No symptom  Feels well  Sleeps well  And enough  No known injury   more urination as sugars likely hi off of medications  Declines flu vaccine  Myalgias gone after off atorvastatin  Tolerates rosuvastatin well    History/Other:   has a current medication list which includes the following prescription(s): rosuvastatin, sitagliptin phosphate, and repaglinide.     has No Known Allergies.     Review of Systems:  GENERAL HEALTH: feels well no complaints  SKIN: denies any unusual skin lesions or rashes  RESPIRATORY: denies shortness of breath with exertion  CARDIOVASCULAR: denies chest pain on exertion  GI: denies abdominal pain and denies heartburn  NEURO: denies headaches    Objective:   /86 (BP Location: Left arm, Patient Position: Sitting, Cuff Size: large)   Pulse 71   Temp 97 °F (36.1 °C) (Temporal)   Resp 12   Ht 5' 8\" (1.727 m)   Wt 214 lb (97.1 kg)   SpO2 98%   BMI 32.54 kg/m²  Estimated body mass index is 32.54 kg/m² as calculated from the following:    Height as of this encounter: 5' 8\" (1.727 m).    Weight as of this encounter: 214 lb (97.1 kg).  GENERAL: well developed, well nourished,in no apparent distress  SKIN: no rashes,no suspicious lesions  HEENT: atraumatic, normocephalic,ears and throat are clear  NECK: supple,no adenopathy,no bruits  LUNGS: clear to auscultation  CARDIO: RRR without murmur  EXTREMITIES: no cyanosis, clubbing or edema  Bilateral barefoot skin diabetic exam is normal, visualized feet and the appearance is normal.  Bilateral monofilament/sensation of both feet is normal.  Pulsation pedal pulse exam of both lower legs/feet is normal as well.        Assessment & Plan:   1. Encounter for long-term (current) use of medications (Primary)  2. Uncontrolled type 2 diabetes mellitus with hyperglycemia (HCC)  -      Microalb/Creat Ratio, Random Urine; Future; Expected date: 01/07/2025  -     Comp Metabolic Panel (14); Future; Expected date: 01/07/2025  -     Microalb/Creat Ratio, Random Urine  -     Comp Metabolic Panel (14)  -     Lipid Panel  -     Hemoglobin A1C  3. Type 2 diabetes mellitus without complication, without long-term current use of insulin (HCC)  Comments:  canonot tolerate metformin  chg to januvia  report if too $$$  Orders:  -     SITagliptin Phosphate; Take 1 tablet (100 mg total) by mouth daily.  Dispense: 90 tablet; Refill: 1  -     Comp Metabolic Panel (14); Future; Expected date: 01/07/2025  -     Comp Metabolic Panel (14)  4. Hypercholesterolemia  -     Rosuvastatin Calcium; Take 1 tablet (10 mg total) by mouth nightly.  Dispense: 90 tablet; Refill: 1  -     Lipid Panel          Sesar Calvo MD, 1/7/2025, 1:06 PM

## 2025-01-08 ENCOUNTER — MED REC SCAN ONLY (OUTPATIENT)
Dept: FAMILY MEDICINE CLINIC | Facility: CLINIC | Age: 50
End: 2025-01-08

## 2025-01-08 ENCOUNTER — TELEPHONE (OUTPATIENT)
Dept: FAMILY MEDICINE CLINIC | Facility: CLINIC | Age: 50
End: 2025-01-08

## 2025-01-08 NOTE — TELEPHONE ENCOUNTER
Received fax from [facility] with results of eye exam on 5-21-24    Dr Calvo carefully reviewed and noted no current or past retinopathy with optos imaging, yearly retinal examination. Care gap.    Copy to scan  Care gap updated.

## 2025-01-10 ENCOUNTER — PATIENT MESSAGE (OUTPATIENT)
Dept: FAMILY MEDICINE CLINIC | Facility: CLINIC | Age: 50
End: 2025-01-10

## 2025-01-10 DIAGNOSIS — E11.65 UNCONTROLLED TYPE 2 DIABETES MELLITUS WITH HYPERGLYCEMIA (HCC): Primary | ICD-10-CM

## 2025-01-10 DIAGNOSIS — E11.9 TYPE 2 DIABETES MELLITUS WITHOUT COMPLICATION, WITHOUT LONG-TERM CURRENT USE OF INSULIN (HCC): ICD-10-CM

## 2025-01-10 DIAGNOSIS — E78.00 HYPERCHOLESTEROLEMIA: ICD-10-CM

## 2025-01-13 RX ORDER — ROSUVASTATIN CALCIUM 10 MG/1
10 TABLET, COATED ORAL NIGHTLY
Qty: 90 TABLET | Refills: 1 | Status: SHIPPED | OUTPATIENT
Start: 2025-01-13 | End: 2025-07-12

## 2025-01-13 RX ORDER — SAXAGLIPTIN 5 MG/1
5 TABLET, FILM COATED ORAL DAILY
Qty: 90 TABLET | Refills: 1 | Status: SHIPPED | OUTPATIENT
Start: 2025-01-13 | End: 2025-07-12

## 2025-01-20 DIAGNOSIS — E11.9 TYPE 2 DIABETES MELLITUS WITHOUT COMPLICATION, WITHOUT LONG-TERM CURRENT USE OF INSULIN (HCC): ICD-10-CM

## 2025-01-20 DIAGNOSIS — E11.65 UNCONTROLLED TYPE 2 DIABETES MELLITUS WITH HYPERGLYCEMIA (HCC): Primary | ICD-10-CM

## 2025-02-18 ENCOUNTER — OFFICE VISIT (OUTPATIENT)
Dept: FAMILY MEDICINE CLINIC | Facility: CLINIC | Age: 50
End: 2025-02-18
Payer: COMMERCIAL

## 2025-02-18 ENCOUNTER — PATIENT MESSAGE (OUTPATIENT)
Dept: FAMILY MEDICINE CLINIC | Facility: CLINIC | Age: 50
End: 2025-02-18

## 2025-02-18 VITALS
DIASTOLIC BLOOD PRESSURE: 81 MMHG | SYSTOLIC BLOOD PRESSURE: 113 MMHG | OXYGEN SATURATION: 95 % | BODY MASS INDEX: 33.59 KG/M2 | RESPIRATION RATE: 12 BRPM | WEIGHT: 214 LBS | HEIGHT: 67 IN | HEART RATE: 87 BPM | TEMPERATURE: 97 F

## 2025-02-18 DIAGNOSIS — E11.65 UNCONTROLLED TYPE 2 DIABETES MELLITUS WITH HYPERGLYCEMIA (HCC): ICD-10-CM

## 2025-02-18 DIAGNOSIS — E78.00 HYPERCHOLESTEROLEMIA: ICD-10-CM

## 2025-02-18 DIAGNOSIS — E66.811 OBESITY (BMI 30.0-34.9): ICD-10-CM

## 2025-02-18 DIAGNOSIS — Z00.00 WELL ADULT EXAM: Primary | ICD-10-CM

## 2025-02-18 PROCEDURE — 3046F HEMOGLOBIN A1C LEVEL >9.0%: CPT | Performed by: FAMILY MEDICINE

## 2025-02-18 PROCEDURE — 3008F BODY MASS INDEX DOCD: CPT | Performed by: FAMILY MEDICINE

## 2025-02-18 PROCEDURE — 3074F SYST BP LT 130 MM HG: CPT | Performed by: FAMILY MEDICINE

## 2025-02-18 PROCEDURE — 3061F NEG MICROALBUMINURIA REV: CPT | Performed by: FAMILY MEDICINE

## 2025-02-18 PROCEDURE — 99396 PREV VISIT EST AGE 40-64: CPT | Performed by: FAMILY MEDICINE

## 2025-02-18 PROCEDURE — 3079F DIAST BP 80-89 MM HG: CPT | Performed by: FAMILY MEDICINE

## 2025-02-18 RX ORDER — REPAGLINIDE 0.5 MG/1
0.5 TABLET ORAL
Qty: 270 TABLET | Refills: 0 | Status: SHIPPED | OUTPATIENT
Start: 2025-02-18 | End: 2025-02-19

## 2025-02-18 NOTE — PROGRESS NOTES
William Swift is a 50 year old male.    CC:    Chief Complaint   Patient presents with    Physical     Reviewed Preventative/Wellness form with patient.          History of Present Illness  The patient, with a history of diabetes and high cholesterol, presents for a routine physical check-up. He is currently on saxagliptin, rosuvastatin, and repaglinide. The patient reports good adherence to his medication regimen and has noticed a decrease in nocturia. He has also been monitoring his blood sugar levels, which have been in the mid-200s.    The patient has been dealing with significant life stressors recently, including the death of his father and the process of buying a house. Despite these stressors, his blood pressure remains well-controlled.    The patient also reports a recent fall on ice, resulting in some discomfort in his arm. However, he did not sustain any significant injuries from the fall.     Subjective:     Chief Complaint Reviewed and Verified  Nursing Notes Reviewed and   Verified  Tobacco Reviewed  Allergies Reviewed  Medications Reviewed    Problem List Reviewed  Medical History Reviewed  Surgical History   Reviewed  Family History Reviewed           History/Other:   Allergies:  Allergies[1]   Current Meds:  has a current medication list which includes the following prescription(s): saxagliptin hcl, rosuvastatin, and repaglinide.      History:  History reviewed. No pertinent past medical history.   History reviewed. No pertinent surgical history.   Family History   Problem Relation Age of Onset    No Known Problems Mother     No Known Problems Father     Cancer Other       Family Status   Relation Status    Mo Alive    Fa Alive    Sis Alive    Bro Alive    Lolita Alive    Other (Not Specified)      Social History     Socioeconomic History    Marital status:    Tobacco Use    Smoking status: Never    Smokeless tobacco: Never   Vaping Use    Vaping status: Never Used   Substance and  Sexual Activity    Alcohol use: No    Drug use: No          Immunization History   Administered Date(s) Administered    Covid-19 Vaccine Moderna 100 mcg/0.5 ml 01/20/2021, 02/25/2021    DTAP 02/15/1977, 05/03/1977, 04/07/1979, 06/20/1979, 08/15/1983    MMR 10/06/1978, 07/27/1992    OPV 02/15/1977, 05/03/1977, 04/07/1979, 06/20/1979, 08/15/1983    TDAP 03/26/2018    Tetanus 07/27/1992       Results  LABS  Continuous blood glucose monitor: 130-250 mg/dL    Wt Readings from Last 6 Encounters:   02/18/25 214 lb (97.1 kg)   01/07/25 214 lb (97.1 kg)   06/18/24 217 lb 3.2 oz (98.5 kg)   01/03/24 217 lb (98.4 kg)   12/28/23 220 lb (99.8 kg)   06/30/23 222 lb (100.7 kg)       BP Readings from Last 3 Encounters:   02/18/25 113/81   01/07/25 123/86   06/18/24 122/78     REVIEW OF SYSTEMS:   GENERAL: feels well otherwise  SKIN: denies any unusual skin lesions  EYES:denies blurred vision or double vision  HEENT: denies nasal congestion, sinus pain or ST  LUNGS: denies shortness of breath with exertion  CARDIOVASCULAR: denies chest pain on exertion  GI: denies abdominal pain,denies heartburn   : denies nocturia or changes in stream  MUSCULOSKELETAL: denies back pain  NEURO: denies headaches  PSYCHE: denies depression or anxiety    Objective:    EXAM:   Blood pressure 113/81, pulse 87, temperature 97.1 °F (36.2 °C), temperature source Temporal, resp. rate 12, height 5' 7\" (1.702 m), weight 214 lb (97.1 kg), SpO2 95%.  Body mass index is 33.52 kg/m².      Reviewed by Dr Calvo  Physical Exam         GENERAL: well developed, well nourished,in no apparent distress  SKIN: no rashes,no suspicious lesions  HEENT: atraumatic, normocephalic,ears and throat are clear  EYES:PERRLA, EOMI, normal optic disk,conjunctiva are clear  NECK: supple,no adenopathy,no bruits  CHEST: no chest tenderness  LUNGS: clear to auscultation  CARDIO: RRR without murmur  GI: good BS's,no masses, HSM or tenderness  : defer  RECTAL: defer          MUSCULOSKELETAL: back is not tender,FROM of the back  EXTREMITIES: no cyanosis, clubbing or edema  NEURO: Oriented times three,cranial nerves are intact,motor and sensory are grossly intact    Assessment & Plan:       ASSESSMENT:    Assessment & Plan  Diabetes Mellitus  Improved symptoms with less nocturia. Currently on saxagliptin and repaglinide. Blood glucose levels have been in the mid-200s, with some readings in the 130-150 range. Recent lifestyle changes and stressors may have impacted management.  -Refill repaglinide for 90 days.  -Encourage consistent medication use and lifestyle modifications including diet and exercise.  -Plan to check blood work in April or May.    Hyperlipidemia  On rosuvastatin. No new issues reported.  -Continue rosuvastatin as prescribed.    General Health Maintenance  Tetanus immunization up to date until 2028.  -Continue current health maintenance plan.       1. Well adult exam (Primary)  2. Uncontrolled type 2 diabetes mellitus with hyperglycemia (HCC)  -     Discontinue: Repaglinide; Take 1 tablet (0.5 mg total) by mouth 3 (three) times daily before meals. Do not take if not eating  Dispense: 270 tablet; Refill: 0  3. Obesity (BMI 30.0-34.9)  Overview:  Estimated body mass index is 33.98 kg/m² as calculated from the following:    Height as of this encounter: 5' 8\" (1.727 m).    Weight as of this encounter: 223 lb 8 oz (101.4 kg).    4. Hypercholesterolemia      Meds & Refills for this Visit:  Requested Prescriptions      No prescriptions requested or ordered in this encounter                    [1] No Known Allergies

## 2025-02-18 NOTE — PROGRESS NOTES
The following individual(s) verbally consented to be recorded using ambient AI listening technology and understand that they can each withdraw their consent to this listening technology at any point by asking the clinician to turn off or pause the recording:    Patient name: William Swift  Additional names:

## 2025-02-19 RX ORDER — REPAGLINIDE 0.5 MG/1
0.5 TABLET ORAL
Qty: 270 TABLET | Refills: 0 | Status: SHIPPED | OUTPATIENT
Start: 2025-02-19

## 2025-04-26 ENCOUNTER — HOSPITAL ENCOUNTER (OUTPATIENT)
Age: 50
Discharge: HOME | End: 2025-04-26
Payer: COMMERCIAL

## 2025-04-26 VITALS
TEMPERATURE: 98 F | OXYGEN SATURATION: 100 % | DIASTOLIC BLOOD PRESSURE: 90 MMHG | HEART RATE: 70 BPM | SYSTOLIC BLOOD PRESSURE: 136 MMHG | RESPIRATION RATE: 16 BRPM

## 2025-04-26 DIAGNOSIS — J02.9 SORE THROAT: ICD-10-CM

## 2025-04-26 DIAGNOSIS — J06.9 VIRAL URI WITH COUGH: Primary | ICD-10-CM

## 2025-04-26 LAB
POCT INFLUENZA A: NEGATIVE
POCT INFLUENZA B: NEGATIVE
S PYO AG THROAT QL: NEGATIVE
SARS-COV-2 RNA RESP QL NAA+PROBE: NOT DETECTED

## 2025-04-26 PROCEDURE — 87081 CULTURE SCREEN ONLY: CPT | Performed by: NURSE PRACTITIONER

## 2025-04-26 RX ORDER — BENZONATATE 100 MG/1
100 CAPSULE ORAL 3 TIMES DAILY PRN
Qty: 30 CAPSULE | Refills: 0 | Status: SHIPPED | OUTPATIENT
Start: 2025-04-26 | End: 2025-05-26

## 2025-04-26 RX ORDER — GUAIFENESIN 600 MG/1
1200 TABLET, EXTENDED RELEASE ORAL 2 TIMES DAILY
Qty: 20 TABLET | Refills: 0 | Status: SHIPPED | OUTPATIENT
Start: 2025-04-26 | End: 2025-05-01

## 2025-04-26 RX ORDER — FLUTICASONE PROPIONATE 50 MCG
2 SPRAY, SUSPENSION (ML) NASAL DAILY
Qty: 16 G | Refills: 0 | Status: SHIPPED | OUTPATIENT
Start: 2025-04-26 | End: 2025-05-26

## 2025-04-26 RX ORDER — ALBUTEROL SULFATE 90 UG/1
2 INHALANT RESPIRATORY (INHALATION) ONCE
Status: COMPLETED | OUTPATIENT
Start: 2025-04-26 | End: 2025-04-26

## 2025-04-26 NOTE — DISCHARGE INSTRUCTIONS
- Tessalon 1 capusule every 8 hours as needed.  - Take plain mucinex - 1200mg twice a day with a big glass of water  Albuterol: 2 puffs every 4-6 hours if needed for wheezing.   - Nasal saline - either spray (\"Ocean\" brand) or Osmar Med Sinus Rinse 3 times a day  Flonase: 2 sprays to each nostril in the AM.  - Rest and push fluids  - Hot lemon tea with honey  - Steam twice a day (either in shower or with cup of hot water and a towel over your head)     Please follow up with your primary care doctor in 1 week if not improving.    If any persistent, worsening or new/ concerning symptoms develop please go to the Emergency room.

## 2025-06-02 ENCOUNTER — APPOINTMENT (OUTPATIENT)
Dept: GENERAL RADIOLOGY | Age: 50
End: 2025-06-02
Attending: NURSE PRACTITIONER
Payer: COMMERCIAL

## 2025-06-02 ENCOUNTER — HOSPITAL ENCOUNTER (OUTPATIENT)
Age: 50
Discharge: HOME | End: 2025-06-02
Payer: COMMERCIAL

## 2025-06-02 VITALS
OXYGEN SATURATION: 95 % | HEART RATE: 95 BPM | WEIGHT: 210 LBS | DIASTOLIC BLOOD PRESSURE: 95 MMHG | SYSTOLIC BLOOD PRESSURE: 126 MMHG | HEIGHT: 68 IN | RESPIRATION RATE: 18 BRPM | BODY MASS INDEX: 31.83 KG/M2 | TEMPERATURE: 98 F

## 2025-06-02 DIAGNOSIS — S69.91XA INJURY OF RIGHT THUMB, INITIAL ENCOUNTER: Primary | ICD-10-CM

## 2025-06-02 PROCEDURE — 73140 X-RAY EXAM OF FINGER(S): CPT | Performed by: NURSE PRACTITIONER

## 2025-06-02 PROCEDURE — 99213 OFFICE O/P EST LOW 20 MIN: CPT | Performed by: NURSE PRACTITIONER

## 2025-06-02 RX ORDER — METHYLPREDNISOLONE 4 MG/1
TABLET ORAL
Qty: 1 EACH | Refills: 0 | Status: SHIPPED | OUTPATIENT
Start: 2025-06-02

## 2025-06-02 NOTE — ED INITIAL ASSESSMENT (HPI)
Pt with co right thumb and hand pain. States on 4/10 he had rifle qualifications and shell hit that area. Wound healed but pt with cont pain

## 2025-06-02 NOTE — ED PROVIDER NOTES
Patient Seen in: [facility]       The following individual(s) verbally consented to be recorded using ambient AI listening technology and understand that they can each withdraw their consent to this listening technology at any point by asking the clinician to turn off or pause the recording:    Patient name: William Swift  Additional names:  Candy Swift (wife)      History  Chief Complaint   Patient presents with    Arm or Hand Injury     right     Stated Complaint: Arm or Hand Injury    Subjective:   HPI     William Swift is a 50 year old male who presents with thumb pain following a work-related injury. He is accompanied by his wife.    In April, he experienced a work-related injury while shooting rifles at a range. He accidentally placed his hand near the ejector port, resulting in bullet ejected and hit his thumb causing a small wound on his thumb, which initially bled and formed a scab. He applied a Band-Aid at the time and did not think much of it.    In the past few weeks, he has noticed increased pain and discomfort in his thumb, particularly when performing tasks that require thumb pressure, such as opening a water bottle or buttoning pants. He describes the pain as a 'zing' that shoots through the thumb and notes a crackling noise similar to knuckle cracking. The pain is located on the posterior aspect of the thumb and sometimes radiates through the thumb. The pain is exacerbated by certain movements and activities.    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 No specific incident directly caused the recent increase in pain.    He has not been taking any specific medications for this issue but is considering over-the-counter options like ibuprofen or naproxen for pain relief. He has not yet tried icing the area consistently.    No recent trauma or  specific incident that directly caused the recent increase in pain.        Objective:     No pertinent past medical history.            No pertinent past surgical history.              No pertinent social history.            Review of Systems    Positive for stated complaint: Arm or Hand Injury  Other systems are as noted in HPI.  Constitutional and vital signs reviewed.      All other systems reviewed and negative except as noted above.                  Physical Exam    ED Triage Vitals [06/02/25 1209]   BP (!) 126/95   Pulse 95   Resp 18   Temp 97.8 °F (36.6 °C)   Temp src Oral   SpO2 95 %   O2 Device None (Room air)       Current Vitals:   Vital Signs  BP: (!) 126/95  Pulse: 95  Resp: 18  Temp: 97.8 °F (36.6 °C)  Temp src: Oral    Oxygen Therapy  SpO2: 95 %  O2 Device: None (Room air)            Physical Exam  Vitals and nursing note reviewed.   Constitutional:       Appearance: Normal appearance.   HENT:      Head: Normocephalic.      Mouth/Throat:      Mouth: Mucous membranes are moist.   Cardiovascular:      Rate and Rhythm: Normal rate.   Pulmonary:      Effort: Pulmonary effort is normal.   Musculoskeletal:      Comments: Pain to the posterior aspect of the right thumb consistent with extensor tendinitis.  Capillary fill less than 3 seconds.  Skins warm dry and intact.  No nail injury.  Normal flexion and extension of thumb with normal strength.   Skin:     General: Skin is warm and dry.   Neurological:      Mental Status: He is alert and oriented to person, place, and time.                 ED Course  Labs Reviewed - No data to display  XR FINGER(S) (MIN 2 VIEWS), RIGHT THUMB (CPT=73140)  Result Date: 6/2/2025  PROCEDURE:  XR FINGER(S) (MIN 2 VIEWS), RIGHT THUMB (CPT=73140)  INDICATIONS:  Arm or Hand Injury  COMPARISON:  None.  TECHNIQUE:  Three views of the finger were obtained.  PATIENT STATED HISTORY: (As transcribed by Technologist)  Patient states on 4/10/25 he had rifle qualifications and shell hit his  right anterior MCP joitn area. Patient had a cut to that area healed. Patient developed pain to right thumb and 1st metacarpal a couple of weeks ago. No known new injury.   FINDINGS:  Negative for fracture, dislocation, deformity or other acute bony abnormalities.  No soft tissue abnormalities.             CONCLUSION:  No acute fracture or other acute bony process.  LOCATION:  [id number]   Dictated by (CST): Jona Monteiro MD on 6/02/2025 at 12:31 PM     Finalized by (CST): Jona Monteiro MD on 6/02/2025 at 12:32 PM                             MDM            Medical Decision Making  Differential diagnosis includes but is not limited to: Hand/thumb-contusion, sprain, fracture, tendinitis      Assessment & Plan  Tendinitis of thumb   Tendinitis of the thumb, likely due to repetitive strain and possible re-aggravation from recent activities. X-ray shows no fractures. Symptoms include pain and crepitus upon movement, with tenderness along the posterior aspect of the thumb. Differential includes sprain or dislocation, but imaging and clinical presentation suggest tendinitis. Discussed the chronic nature of tendinitis and potential for re-aggravation due to daily use. Explained that tendinitis can be difficult to heal due to constant use and may take time to resolve. Discussed the use of NSAIDs and icing to manage inflammation and pain, with the option of a Medrol dose pack for additional anti-inflammatory effect. Advised against concurrent use of NSAIDs and steroids to prevent gastrointestinal ulcers.   - Recommend ibuprofen or naproxen for anti-inflammatory and analgesic effects.   - Advise icing the thumb to reduce inflammation.   - Prescribe Medrol dose pack for additional anti-inflammatory effect, ensuring not to take concurrently with NSAIDs to avoid gastrointestinal ulcers.   - Instruct to take medications with food to protect the stomach.   - Refer to hand specialist, Dr. Main Gallardo, for further evaluation and  potential imaging or physical therapy. - Advise scheduling the specialist appointment promptly due to potential wait times.   - Offer work note for desk duty until cleared by orthopedics, which patient accepted.      Amount and/or Complexity of Data Reviewed  Radiology: ordered and independent interpretation performed. Decision-making details documented in ED Course.     Details: X-ray right thumb    Risk  OTC drugs.  Prescription drug management.        Disposition and Plan     Clinical Impression:  1. Injury of right thumb, initial encounter         Disposition:  Discharge  6/2/2025 12:42 pm    Follow-up:  Main Gallardo MD  [address] [place] [id number]  [phone]    Schedule an appointment as soon as possible for a visit             Medications Prescribed:  Discharge Medication List as of 6/2/2025 12:44 PM        START taking these medications    Details   methylPREDNISolone (MEDROL) 4 MG Oral Tablet Therapy Pack Dosepack: take as directed, Normal, Disp-1 each, R-0                   Supplementary Documentation:

## 2025-06-03 ENCOUNTER — TELEPHONE (OUTPATIENT)
Facility: CLINIC | Age: 50
End: 2025-06-03

## 2025-06-10 ENCOUNTER — OFFICE VISIT (OUTPATIENT)
Age: 50
End: 2025-06-10
Payer: OTHER MISCELLANEOUS

## 2025-06-10 DIAGNOSIS — M79.644 THUMB PAIN, RIGHT: Primary | ICD-10-CM

## 2025-06-10 PROCEDURE — 99203 OFFICE O/P NEW LOW 30 MIN: CPT | Performed by: STUDENT IN AN ORGANIZED HEALTH CARE EDUCATION/TRAINING PROGRAM

## 2025-06-10 NOTE — PROGRESS NOTES
Clinic Note     Allergies[1]    CC: Right thumb injury    DOI: 4/10/25    HPI:   History of Present Illness  William Swift is a 50 year old male who presents with right thumb pain following a previous injury during rifle training.    In April, he sustained an injury to his right thumb during rifle training at work in the [employer] office. He held the rifle by the magazine well with his thumb up, and when a round ejected, it hit his thumb, causing a small cut that healed. Initially, he did not experience significant pain, but he now reports persistent pain in the thumb.    Approximately a month ago, during defensive tactics training, he began experiencing pain in the thumb while performing simple tasks such as buttoning pants, using a radio, and opening bottles. He describes the pain as shooting up the backside of the thumb and sometimes across the front. No locking or getting stuck of the thumb.    He reports cracking and popping in the thumb when bending it, which occurs once or twice a week. He also describes a sensation of having a 'rubber band' on his thumb and notes a difference in sensation compared to his other hand, with occasional numbness.    He has not been immobilized or used a brace since the injury. He has been on desk duty at work since last week due to the thumb pain.    He has been taking ibuprofen to manage the pain and inflammation and reports that he does not have trouble sleeping at night, but the pain increases with activity during the day.    History/Other:   Past Medical History[2]  Past Surgical History[3]  Current Medications[4]  Family History[5]  Social History     Occupational History    Not on file   Tobacco Use    Smoking status: Never    Smokeless tobacco: Never   Vaping Use    Vaping status: Never Used   Substance and Sexual Activity    Alcohol use: No    Drug use: No    Sexual activity: Not on file        Review of Systems:  Negative unless stated in HPI.    Physical Exam:          Constitutional: NAD. AOx3. Well-developed and Well-nourished.   Psychiatric: Normal mood/ affect/ behavior. Judgment and thought content normal.     Right Upper Extremity:     Skin clean and dry.   No lacerations, no abrasions.  No erythema  Mild to moderate edema about the thumb MCP joint  No tenderness in anatomic snuffbox, in ulnar fovea, over dorsal scapholunate interosseous ligament, lateral epicondyle, or over distal pole of the scaphoid.    Tender to palpation about the ulnar aspect of the thumb MCP joint  Somewhat tender to palpation about the radial aspect of the thumb MCP joint as well  I feel a firm endpoint with varus and valgus stress of the thumb MCPJ, although there appears to be trace laxity with valgus stress  Able to flex and extend thumb with ease  Brisk capillary refill less than 2 seconds in all digits, bilaterally.    Fingers warm and well perfused  Neurologic:   Sensation intact to light touch light touch in the radial, ulnar, median distributions bilaterally.   No atrophy, normal muscle tone.  Grossly intact sensation       Imaging:  I reviewed the images independently from the professional interpretation, and discussed my interpretation of the pertinent findings with patient/family.    XR right thumb 3V: No acute osseous abnormality.     Assessment/Plan:  50 year old male with Right thumb pain after an injury on 4/10.    The nature of the condition was discussed with the patient today including reviewing the xrays. The risks/benefits, pros/cons and reasonable expectations of treatment options were also discussed today. Education and counselling was given for the above diagnosis.   Assessment & Plan  Right thumb possible ligament sprain  Presents with a possible right thumb ligament sprain, likely resulting from a previous injury during rifle training in April and exacerbated by subsequent activities, including defensive tactics training. Pain is primarily located across the top of the  thumb, associated with cracking, popping. Examination reveals some laxity, suggesting a possible sprain of the ulnar collateral ligament (UCL). Differential diagnosis includes trigger thumb, but pain location is atypical for this condition. Symptoms have persisted for approximately two months, indicating a subacute condition. An MRI is warranted to assess the extent of ligament injury and rule out other potential issues. MRI findings may alter treatment, potentially indicating surgery if significant ligament damage is found.  - Order MRI of the right thumb to assess ligament injury.  - Provide a thumb brace for immobilization during sleep and activities.  - Recommend desk duty only, with no use of the right hand for four weeks.  - Continue ibuprofen for inflammation and pain management.  - Advise icing the thumb to reduce swelling and pain.  - Schedule follow-up appointment after the MRI to discuss results and further management.      Johnathan Solano MD   Hand, Wrist, & Elbow Surgery  [email or web address]       [1] No Known Allergies  [2]   Past Medical History:   Diabetes (HCC)    Hyperlipidemia   [3] No past surgical history on file.  [4]   Current Outpatient Medications   Medication Sig Dispense Refill    methylPREDNISolone (MEDROL) 4 MG Oral Tablet Therapy Pack Dosepack: take as directed (Patient not taking: Reported on 6/10/2025) 1 each 0    Repaglinide 0.5 MG Oral Tab Take 1 tablet (0.5 mg total) by mouth 3 (three) times daily before meals. Do not take if not eating 270 tablet 0    sAXagliptin HCl 5 MG Oral Tab Take 5 mg by mouth daily. 90 tablet 1    rosuvastatin 10 MG Oral Tab Take 1 tablet (10 mg total) by mouth nightly. 90 tablet 1   [5]   Family History  Problem Relation Age of Onset    No Known Problems Mother     No Known Problems Father     Cancer Other

## 2025-06-23 ENCOUNTER — HOSPITAL ENCOUNTER (OUTPATIENT)
Dept: MRI IMAGING | Facility: HOSPITAL | Age: 50
Discharge: HOME | End: 2025-06-23
Attending: STUDENT IN AN ORGANIZED HEALTH CARE EDUCATION/TRAINING PROGRAM
Payer: OTHER MISCELLANEOUS

## 2025-06-23 ENCOUNTER — TELEPHONE (OUTPATIENT)
Dept: ORTHOPEDICS CLINIC | Facility: CLINIC | Age: 50
End: 2025-06-23

## 2025-06-23 DIAGNOSIS — M79.644 THUMB PAIN, RIGHT: ICD-10-CM

## 2025-06-23 PROCEDURE — 73218 MRI UPPER EXTREMITY W/O DYE: CPT | Performed by: STUDENT IN AN ORGANIZED HEALTH CARE EDUCATION/TRAINING PROGRAM

## 2025-06-23 NOTE — TELEPHONE ENCOUNTER
Patient got his MRI done of the right thumb done today and wanted to schedule a sooner follow up to review results. Currently on for 7/8 and put on the wait list. Please advise if he can be seen sooner.  Future Appointments   Date Time Provider Department Center   7/8/2025 10:20 AM Johnathan Solano MD [facility]

## 2025-06-23 NOTE — TELEPHONE ENCOUNTER
Spoke with patient and rescheduled with an opening 7/3/25.  Patient appreciative.    Future Appointments   Date Time Provider Department Center   7/3/2025  9:20 AM Johnathan Solano MD [facility]

## 2025-06-24 ENCOUNTER — TELEPHONE (OUTPATIENT)
Dept: ORTHOPEDICS CLINIC | Facility: CLINIC | Age: 50
End: 2025-06-24

## 2025-06-25 NOTE — TELEPHONE ENCOUNTER
Future Appointments   Date Time Provider Department Center   7/3/2025  9:20 AM Johnathan Solano MD [facility]

## 2025-06-30 ENCOUNTER — OFFICE VISIT (OUTPATIENT)
Age: 50
End: 2025-06-30
Payer: OTHER MISCELLANEOUS

## 2025-06-30 DIAGNOSIS — S63.641S: Primary | ICD-10-CM

## 2025-06-30 PROCEDURE — 99213 OFFICE O/P EST LOW 20 MIN: CPT | Performed by: STUDENT IN AN ORGANIZED HEALTH CARE EDUCATION/TRAINING PROGRAM

## 2025-06-30 NOTE — PROGRESS NOTES
Clinic Note     Allergies[1]    CC: Right thumb injury    DOI: 4/10/25    HPI:   History of Present Illness  From prior visit 6/10/25:  William Swift is a 50 year old male who presents with right thumb pain following a previous injury during rifle training.    In April, he sustained an injury to his right thumb during rifle training at work in the 's office. He held the rifle by the magazine well with his thumb up, and when a round ejected, it hit his thumb, causing a small cut that healed. Initially, he did not experience significant pain, but he now reports persistent pain in the thumb.    Approximately a month ago, during defensive tactics training, he began experiencing pain in the thumb while performing simple tasks such as buttoning pants, using a radio, and opening bottles. He describes the pain as shooting up the backside of the thumb and sometimes across the front. No locking or getting stuck of the thumb.    He reports cracking and popping in the thumb when bending it, which occurs once or twice a week. He also describes a sensation of having a 'rubber band' on his thumb and notes a difference in sensation compared to his other hand, with occasional numbness.    He has not been immobilized or used a brace since the injury. He has been on desk duty at work since last week due to the thumb pain.    He has been taking ibuprofen to manage the pain and inflammation and reports that he does not have trouble sleeping at night, but the pain increases with activity during the day.      Today's visit 6/30/25:  William Swift returns for follow up today.    He has been wearing the right thumb spica brace, which has provided some improvement in his symptoms. He consistently uses the brace.    An MRI was conducted, and William returns today mainly to review those results.    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 He is eager to resume his regular duties. He has not been on any specific medication  regimen for this injury, and there is no mention of other treatments aside from the brace and planned therapy.    Review of Systems:  Negative unless stated in HPI.    Physical Exam:         Constitutional: NAD. AOx3. Well-developed and Well-nourished.   Psychiatric: Normal mood/ affect/ behavior. Judgment and thought content normal.     Right Upper Extremity:     Skin clean and dry.   No lacerations, no abrasions.  No erythema  Mild edema about the thumb MCP joint persists  No tenderness in anatomic snuffbox, in ulnar fovea, over dorsal scapholunate interosseous ligament, lateral epicondyle, or over distal pole of the scaphoid.    Tender to palpation about the ulnar aspect of the thumb MCP joint but improved from prior  I feel a firm endpoint with varus and valgus stress of the thumb MCPJ  Able to flex and extend thumb with ease  Brisk capillary refill less than 2 seconds in all digits, bilaterally.    Fingers warm and well perfused  Neurologic:   Sensation intact to light touch light touch in the radial, ulnar, median distributions bilaterally.   No atrophy, normal muscle tone.  Grossly intact sensation       Imaging:  I reviewed the images independently from the professional interpretation, and discussed my interpretation of the pertinent findings with patient/family.    MRI of right thumb from 6/23/25: Reveals grade 1/2 sprain of the right thumb ulnar collateral ligament; some attenuation of distal aspect of ligament but ligament does still insert onto the proximal phalanx.    Assessment/Plan:  50 year old male with Right thumb ulnar collateral ligament sprain, likely DOI 4/10/25.    The nature of the condition was discussed with the patient today including reviewing the xrays. The risks/benefits, pros/cons and reasonable expectations of treatment options were also discussed today. Education and counselling was given for the above diagnosis.   Assessment & Plan  Right thumb ulnar collateral ligament  injury  Likely sustained in early April during physical training. MRI confirms the ligament is attached at both insertions, indicating a partial injury. The injury is not surgical due to the intact ligament. He experiences discomfort but has shown improvement with brace use. The injury is expected to heal with therapy, though the timeline is uncertain. Surgery is considered only if pain and dysfunction persists post-therapy.  - Prescribe hand therapy focusing on gradually increasing activity and weight limits for the thumb.  - Provide two therapy prescriptions: one for in-house therapy and one for external options.  - Advise wearing the brace when lifting over five pounds and gradually reducing its use during the day.  - Schedule a follow-up appointment in four weeks to assess progress and determine readiness to return to work.  - Discuss the possibility of returning to work earlier if therapy progresses well and he feels ready.  - Advise contacting the office if there are delays in scheduling therapy sessions.    All questions answered.    Johnathan Solano MD   Hand, Wrist, & Elbow Surgery  [email or web address]         [1] No Known Allergies

## 2025-07-02 ENCOUNTER — OFFICE VISIT (OUTPATIENT)
Dept: OCCUPATIONAL MEDICINE | Age: 50
End: 2025-07-02
Attending: STUDENT IN AN ORGANIZED HEALTH CARE EDUCATION/TRAINING PROGRAM
Payer: OTHER MISCELLANEOUS

## 2025-07-02 DIAGNOSIS — S63.641S: Primary | ICD-10-CM

## 2025-07-02 PROCEDURE — 97110 THERAPEUTIC EXERCISES: CPT

## 2025-07-02 PROCEDURE — 97035 APP MDLTY 1+ULTRASOUND EA 15: CPT

## 2025-07-02 PROCEDURE — 97165 OT EVAL LOW COMPLEX 30 MIN: CPT

## 2025-07-02 NOTE — PROGRESS NOTES
OT UE EVALUATION:     Diagnosis:   Sprain of the right thumb UCL Patient:  William Swift (50 year old, male)        Referring Provider: Johnathan Solano  Today's Date   7/2/2025    Precautions:     none Date of Evaluation: 07/02/25  Next MD visit: 7/29/25  Date of Injury: 4/10/25  Date of Surgery: n/a     PATIENT SUMMARY     Summary of chief complaints: right thumb soreness, weakness  History of current condition: In April, he sustained an injury to his right thumb during rifle training at work in the [email or web address]'s office. He held the rifle by the magazine well with his thumb up, and when a round ejected, it hit his thumb, causing a small cut that healed.  But continued with pain in the thumb.  Braced x \"couple of weeks\".  Is on light duty; wearing brace mostly at night for the past 2-3 days.   Pain level: current 0 /10, at best 0 /10, at worst 1 /10  Description of symptoms: sore   Occupation: law enforcement   Occupational Roles: worker (law enforcement)   Leisure activities/Hobbies:   None described  Prior level of function: ind  Current limitations: pressing button on mihaela, pinching objects  Pt goals: Full use of thumb  Hand Dominance: left  Living Situation: family    Past medical history was reviewed with William.  Significant findings include: none  Imaging/Tests: MRI of right thumb from 6/23/25: Reveals grade 1/2 sprain of the right thumb ulnar collateral ligament; some attenuation of distal aspect of ligament but ligament does still insert onto the proximal phalanx.   William  has a past medical history of Diabetes (HCC) and Hyperlipidemia.  He  has no past surgical history on file.    ASSESSMENT  William presents to occupational therapy evaluation with primary c/o right thumb soreness, weakness. The results of the objective tests and measures show mild weakness in right hand and thumb. Functional deficits include but are not limited to pressing button on mihaela, pinching objects. Signs and symptoms are consistent  with diagnosis of Sprain of the right thumb UCL. Pt and OT discussed evaluation findings, pathology, POC and HEP.  Pt voiced understanding and performs HEP correctly without reported pain. Skilled Occupational Therapy is medically necessary to address the above impairments and reach functional goals.    OBJECTIVE:      Musculoskeletal  Observation: Unremarkable              ROM and Strength    Thumb ROM   MP Flex IP Flex Radial Abd Palmar Abd Opposition     Right 40 55 50 55 10     Left 40 45 50 55 10      Hand Strength (lbs) R L      100 95     3 pt Pinch 14 16     Lateral pinch 14 17                 Neurological:  Sensory: WNL       Light Touch Ten Test: n/a    ADLs/IADLs:  ADL's    Bathing: mod     Dressing: modified     Feeding: modified     Grooming: modified  IADL's     Homemaking: modified     Food Prep: modified     Driving: ind   Other Functional Mobility/ADL Comments: ind      Today's Treatment and Response:   Pt education was provided on exam findings, treatment diagnosis, treatment plan, expectations, and prognosis.    Today's Treatment       7/2/2025   OT Treatment   Therapeutic Exercise Fluidotherapy for 10 minutes to right hand, with AROM x thumb performed.  Putty: red, , three point pinch, lateral pinch, finger/thumb ext, thumb IPJ/MPJ flexion, lumbrical , thumb add   Modalities Ultrasound:  3mHz  0.8w/cm2  100%  to right thumb UCL  for 8 minutes.     Therapeutic Exercise Min 25   Ultrasound Min 8   Eval Min 27   Total Timed Procedures 33   Total Service Procedures 60   Total Time 60         Patient was instructed in and issued a HEP for: Putty: red, , three point pinch, lateral pinch, finger/thumb ext, thumb IPJ/MPJ flexion, lumbrical , thumb add     Charges:  OT EVAL Low Complexity, 2 TE, 1 US   Based on analysis of data from a problem-focused assessment from a brief chart review, clinical presentation of physical, cognitive and psychosocial skills, as well as review of patient  rated outcome measures, this evaluation involved Low complexity decision making, with 1-3 occupational performance component deficits, no comorbidities, and no need for modification of tasks or assistance with assessment.                                                                                    PLAN OF CARE:      Goals: (to be met in 8 visits)    Not Met Progress Toward Partially Met Met   Patient will report no pain with simulation of work tasks. [] [] [] []   Patient will present with increase in force tolerance of gripping with the right hand  simulating work tasks, with no more than 1/10 pain to allow for ease with gripping work tools. [] [] [] []   Patient will demonstrate balanced posture in thumb joints during three-point pinch test and during pinch of ADL tools. [] [] [] []   Patient will be able to lift, carry, push pull up to 50# without pain. [] [] [] []   Patient will demonstrate an improvement in the self rated DASH score to 11% or better to reflect functional gains reported, in the achievement of opening a jar, carrying objects and in daily skills.   [] [] [] []          Frequency / Duration: Patient will be seen 2x/week or a total of 8 visits over a 90 day period. Treatment will include: Manual Therapy; Therapeutic Exercise; Home Exercise Program instruction; Neuromuscular Re-education; Therapeutic Activities; Ultrasound (FT, taping)    Education or treatment limitation: None   Rehab Potential: excellent     QuickDASH Outcome Score  Score: (Patient-Rptd) 15.91 % (6/30/2025  4:29 PM)      Patient/Family/Caregiver was advised of these findings, precautions, and treatment options and has agreed to actively participate in planning and for this course of care.      Mikayla Ramos, RAIN, OTR/L, CHT    Physician's certification required: Yes  I certify the need for these services furnished under this plan of treatment and while under my care.    X___________________________________________________  Date____________________    Certification From: 7/2/2025  To: 9/30/2025

## 2025-07-08 ENCOUNTER — OFFICE VISIT (OUTPATIENT)
Dept: OCCUPATIONAL MEDICINE | Age: 50
End: 2025-07-08
Attending: STUDENT IN AN ORGANIZED HEALTH CARE EDUCATION/TRAINING PROGRAM
Payer: OTHER MISCELLANEOUS

## 2025-07-08 PROCEDURE — 97112 NEUROMUSCULAR REEDUCATION: CPT

## 2025-07-08 PROCEDURE — 97035 APP MDLTY 1+ULTRASOUND EA 15: CPT

## 2025-07-08 PROCEDURE — 97110 THERAPEUTIC EXERCISES: CPT

## 2025-07-08 NOTE — PROGRESS NOTES
Patient: William Swift (50 year old, male) Referring Provider:  Insurance:   Diagnosis: Sprain of the right thumb UCL Johnathan Solaon  [employer]   Date of Surgery: n/a Next MD visit:  WORK[employer] COMP   Precautions:    7/29/25 Referral Information:   Date of Injury: 4/10/25 Date of Evaluation: Req: 8, Auth: 8, Exp: 6/30/2026 07/02/25 POC Auth Visits:  8       Today's Date   7/8/2025    Subjective  Reports feeling a 2-3 pain in thumb when holding a jug of water.       Pain: 3/10     Objective  See detail of treatment provided below.             Assessment  Patient presents with report of incident of pain in thumb as noted above. This is fortuatous, as it reminds him that he still need to strengthen and heal the ligamentous structures in the thumb.  That said, with tape he did well with thumb activities in clinic today.  He appears eager to return to work, but has agreed to continue with OT for at least two more weeks and then reassess.    Goals (to be met in 8 visits)      Not Met Progress Toward Partially Met Met   Patient will report no pain with simulation of work tasks. [] [x] [] []   Patient will present with increase in force tolerance of gripping with the right hand  simulating work tasks, with no more than 1/10 pain to allow for ease with gripping work tools. [] [x] [] []   Patient will demonstrate balanced posture in thumb joints during three-point pinch test and during pinch of ADL tools. [] [x] [] []   Patient will be able to lift, carry, push pull up to 50# without pain. [] [x] [] []   Patient will demonstrate an improvement in the self rated DASH score to 11% or better to reflect functional gains reported, in the achievement of opening a jar, carrying objects and in daily skills.   [] [x] [] []              Plan  US, FT, thumb stabilization and strengthening, wrist PRE's with 5#, consider SciFit    Treatment Last 4 Visits        7/2/2025 7/8/2025   OT Treatment   Treatment Day  2   Therapeutic  Exercise Fluidotherapy for 10 minutes to right hand, with AROM x thumb performed.  Putty: red, , three point pinch, lateral pinch, finger/thumb ext, thumb IPJ/MPJ flexion, lumbrical , thumb add Fluidotherapy for 10 minutes to right hand, with AROM x thumb performed.     Neuro Re-Educ  Thumb proprioceptive re-ed: thumb card pushing, thumb proprio stick, knobby football pushes, maze ball, Chinese balls, vibrating ball   Modalities Ultrasound:  3mHz  0.8w/cm2  100%  to right thumb UCL  for 8 minutes.   Ultrasound:  3mHz  1.0 w/cm2  100%  to right thumb UCL for 8 minutes.  Thin strip of Rock tape applied in spiral support of right thumb     Neuro Re-Ed Min  27   Therapeutic Exercise Min 25 10   Ultrasound Min 8 8   Eval Min 27    Total Timed Procedures 33 45   Total Service Procedures 60 45   Total Time 60 45         HEP  Thumb cards, cell phone swipes, thumb proprio stick    Charges     1 TE, 2 NM, 1 US    Mikayla aRmos, RAIN, OTR/L, CHT

## 2025-07-11 ENCOUNTER — OFFICE VISIT (OUTPATIENT)
Dept: OCCUPATIONAL MEDICINE | Age: 50
End: 2025-07-11
Attending: STUDENT IN AN ORGANIZED HEALTH CARE EDUCATION/TRAINING PROGRAM
Payer: OTHER MISCELLANEOUS

## 2025-07-11 PROCEDURE — 97530 THERAPEUTIC ACTIVITIES: CPT

## 2025-07-11 PROCEDURE — 97110 THERAPEUTIC EXERCISES: CPT

## 2025-07-11 PROCEDURE — 97035 APP MDLTY 1+ULTRASOUND EA 15: CPT

## 2025-07-11 NOTE — PROGRESS NOTES
Patient: William Swift (50 year old, male) Referring Provider:  Insurance:   Diagnosis: Sprain of the right thumb UCL Johnathan Solano  [employer]   Date of Surgery: n/a Next MD visit:  [employer] COMP   Precautions:   none 7/29/25 Referral Information:   Date of Injury: 4/10/25 Date of Evaluation: Req: 8, Auth: 8, Exp: 6/30/2026 07/02/25 POC Auth Visits:  8       Today's Date   7/11/2025    Subjective  Reports no pain but some stiffness in muscles in the thumb.       Pain: 0/10     Objective  See detail of treatment provided below.    Rt/Lt Thumb       7/2/2025 7/11/2025   Thumb ROM Rt/Lt   Thumb MP Flex Rt 40 45   Thumb IP Flex Rt 55 55   Thumb Radial ABD Rt 50    Thumb Palmar ABD Rt 55    Thumb Opposition Rt 10    Thumb MP Flex Lt 40    Thumb IP Flex Lt 45    Thumb Radial ABD Lt 50    Thumb Palmar ABD Lt 55    Thumb Opposition Lt 10     Hand Strength       7/2/2025 7/11/2025   Hand Strength    Rt 100     Lt 95    3 Pt Pinch Rt 14 14   3 Pt Pinch Lt 16    Lateral Pinch Rt 14 15   Lateral Pinch Lt 17             Assessment  ROM is stable, as is strength, but the key note is that pain is reducing and has not been provoked since last seen earlier this week.  Patient needs to work on heavier, progressive work simulation activities.    Goals (to be met in 8 visits)      Not Met Progress Toward Partially Met Met   Patient will report no pain with simulation of work tasks. [] [x] [] []   Patient will present with increase in force tolerance of gripping with the right hand  simulating work tasks, with no more than 1/10 pain to allow for ease with gripping work tools. [] [x] [] []   Patient will demonstrate balanced posture in thumb joints during three-point pinch test and during pinch of ADL tools. [] [x] [] []   Patient will be able to lift, carry, push pull up to 50# without pain. [] [x] [] []   Patient will demonstrate an improvement in the self rated DASH score to 11% or better to reflect functional gains  reported, in the achievement of opening a jar, carrying objects and in daily skills.   [] [x] [] []                  Plan  US, FT, thumb stabilization and strengthening, wrist PRE's with 6#, consider SciFit; lift, carry crate, bucket, box    Treatment Last 4 Visits        7/2/2025 7/8/2025 7/11/2025   OT Treatment   Treatment Day  2 3   Therapeutic Exercise Fluidotherapy for 10 minutes to right hand, with AROM x thumb performed.  Putty: red, , three point pinch, lateral pinch, finger/thumb ext, thumb IPJ/MPJ flexion, lumbrical , thumb add Fluidotherapy for 10 minutes to right hand, with AROM x thumb performed.   Fluidotherapy for 10 minutes to right hand, with AROM x thumb performed.  Wrist PRE's: 5#, 3 sets of 15  Lumbrical  strengthening: with lumbrical gripper 1 with yellow sponge insert, of cotton ball mix  Pinch strengthening with green clip and cotton ball mix   Neuro Re-Educ  Thumb proprioceptive re-ed: thumb card pushing, thumb proprio stick, knobby football pushes, maze ball, Chinese balls, vibrating ball    Therapeutic Activity   Bulk putty: tan, , roll, pinch and search   Modalities Ultrasound:  3mHz  0.8w/cm2  100%  to right thumb UCL  for 8 minutes.   Ultrasound:  3mHz  1.0 w/cm2  100%  to right thumb UCL for 8 minutes.  Thin strip of Rock tape applied in spiral support of right thumb   Ultrasound:  3mHz  0.8w/cm2  100%  to right thumb UCL  for 8 minutes.     Neuro Re-Ed Min  27    Therapeutic Exercise Min 25 10 29   Ther Activity Min   8   Ultrasound Min 8 8 8   Eval Min 27     Total Timed Procedures 33 45 45   Total Service Procedures 60 45 45   Total Time 60 45 45         HEP  Continue with thumb strengthening.    Charges     2 TE, 1 US, 1 TA    Mikayla Ramos, JODYS, OTR/L, CHT

## 2025-07-16 ENCOUNTER — OFFICE VISIT (OUTPATIENT)
Dept: OCCUPATIONAL MEDICINE | Facility: HOSPITAL | Age: 50
End: 2025-07-16
Attending: STUDENT IN AN ORGANIZED HEALTH CARE EDUCATION/TRAINING PROGRAM
Payer: OTHER MISCELLANEOUS

## 2025-07-16 PROCEDURE — 97110 THERAPEUTIC EXERCISES: CPT

## 2025-07-16 PROCEDURE — 97530 THERAPEUTIC ACTIVITIES: CPT

## 2025-07-16 NOTE — PROGRESS NOTES
Patient: William Swift (50 year old, male) Referring Provider:  Insurance:   Diagnosis: Sprain of the right thumb UCL Johnathan Solano  [employer]   Date of Surgery: n/a Next MD visit:  [employer] COMP   Precautions:    7/29/25 Referral Information:   Date of Injury: 4/10/25 Date of Evaluation: Req: 8, Auth: 8, Exp: 6/30/2026 07/02/25 POC Auth Visits:  8       Today's Date   7/16/2025    Subjective  Reports no pain but slight stiffness at the end of the day       Pain: 0/10     Objective  See detail of treatment provided below.             Assessment  Patient tolerated all strength exercises today with no increase in pain. He demonstrated the ability to carry 30 lbs several feet utilizing proper body mechanics    Goals (to be met in 8 visits)      Not Met Progress Toward Partially Met Met   Patient will report no pain with simulation of work tasks. [] [x] [] []   Patient will present with increase in force tolerance of gripping with the right hand  simulating work tasks, with no more than 1/10 pain to allow for ease with gripping work tools. [] [x] [] []   Patient will demonstrate balanced posture in thumb joints during three-point pinch test and during pinch of ADL tools. [] [x] [] []   Patient will be able to lift, carry, push pull up to 50# without pain. [] [x] [] []   Patient will demonstrate an improvement in the self rated DASH score to 11% or better to reflect functional gains reported, in the achievement of opening a jar, carrying objects and in daily skills.   [] [x] [] []        Plan  continue progressing with strength    Treatment Last 4 Visits        7/2/2025 7/8/2025 7/11/2025 7/16/2025   OT Treatment   Treatment Day  2 3 4   Therapeutic Exercise Fluidotherapy for 10 minutes to right hand, with AROM x thumb performed.  Putty: red, , three point pinch, lateral pinch, finger/thumb ext, thumb IPJ/MPJ flexion, lumbrical , thumb add Fluidotherapy for 10 minutes to right hand, with AROM x thumb  performed.   Fluidotherapy for 10 minutes to right hand, with AROM x thumb performed.  Wrist PRE's: 5#, 3 sets of 15  Lumbrical  strengthening: with lumbrical gripper 1 with yellow sponge insert, of cotton ball mix  Pinch strengthening with green clip and cotton ball mix Extrinsic forearm flexor and extensor stretches  Thumb stretches  5 lbs wrist flexion/extension 2 sets of 10  Green flex bar 10 reps in supination and pronation    Neuro Re-Educ  Thumb proprioceptive re-ed: thumb card pushing, thumb proprio stick, knobby football pushes, maze ball, Chinese balls, vibrating ball     Therapeutic Activity   Bulk putty: tan, , roll, pinch and search Yellow putty:  Roll, pinch, pull and retrieve marbles     Lateral and 2 pt. Pinch using a green clothes pin picking up sponges     Lumbrical grasp picking up sponges     Black sports cords pushing in neutral and pronation 2 sets of 10   Black sports cords pulling in neutral and pronation 2 sets of 10   Crate carry 20 lbs walking length of gym and then 30 lbs   Trampoline ball toss with 2 lbs bilateral and one hand and 4 lbs bilateral hands   Large body blade for grasp and total body workout   Modalities Ultrasound:  3mHz  0.8w/cm2  100%  to right thumb UCL  for 8 minutes.   Ultrasound:  3mHz  1.0 w/cm2  100%  to right thumb UCL for 8 minutes.  Thin strip of Rock tape applied in spiral support of right thumb   Ultrasound:  3mHz  0.8w/cm2  100%  to right thumb UCL  for 8 minutes.      Neuro Re-Ed Min  27     Therapeutic Exercise Min 25 10 29 15   Ther Activity Min   8 30   Ultrasound Min 8 8 8    Eval Min 27      Total Timed Procedures 33 45 45 45   Total Service Procedures 60 45 45 45   Total Time 60 45 45 45         HEP       Charges     TE x 1, TA x 2

## 2025-07-18 ENCOUNTER — OFFICE VISIT (OUTPATIENT)
Dept: OCCUPATIONAL MEDICINE | Facility: HOSPITAL | Age: 50
End: 2025-07-18
Attending: STUDENT IN AN ORGANIZED HEALTH CARE EDUCATION/TRAINING PROGRAM
Payer: OTHER MISCELLANEOUS

## 2025-07-18 PROCEDURE — 97530 THERAPEUTIC ACTIVITIES: CPT

## 2025-07-18 PROCEDURE — 97110 THERAPEUTIC EXERCISES: CPT

## 2025-07-18 NOTE — PROGRESS NOTES
Patient: William Swift (50 year old, male) Referring Provider:  Insurance:   Diagnosis: Sprain of the right thumb UCL Johnathan Solano  WORKERS COMP   Date of Surgery: n/a Next MD visit:  WORK[employer] COMP   Precautions:    7/29/25 Referral Information:   Date of Injury: 4/10/25 Date of Evaluation: Req: 8, Auth: 8, Exp: 6/30/2026 07/02/25 POC Auth Visits:  8       Today's Date   7/18/2025    Subjective  Patient reports that he didn't experience any pain after his last session.       Pain: 0/10     Objective  See detail of treatment provided below.             Assessment  Patient is exerperiencing no thumb pain with progression of strengh exercises.    Goals (to be met in 8 visits)      Not Met Progress Toward Partially Met Met   Patient will report no pain with simulation of work tasks. [] [x] [] []   Patient will present with increase in force tolerance of gripping with the right hand  simulating work tasks, with no more than 1/10 pain to allow for ease with gripping work tools. [] [x] [] []   Patient will demonstrate balanced posture in thumb joints during three-point pinch test and during pinch of ADL tools. [] [x] [] []   Patient will be able to lift, carry, push pull up to 50# without pain. [] [x] [] []   Patient will demonstrate an improvement in the self rated DASH score to 11% or better to reflect functional gains reported, in the achievement of opening a jar, carrying objects and in daily skills.   [] [x] [] []            Plan  continue progressing with strength    Treatment Last 4 Visits        7/8/2025 7/11/2025 7/16/2025 7/18/2025   OT Treatment   Treatment Day 2 3 4 5   Therapeutic Exercise Fluidotherapy for 10 minutes to right hand, with AROM x thumb performed.   Fluidotherapy for 10 minutes to right hand, with AROM x thumb performed.  Wrist PRE's: 5#, 3 sets of 15  Lumbrical  strengthening: with lumbrical gripper 1 with yellow sponge insert, of cotton ball mix  Pinch strengthening with green clip  and cotton ball mix Extrinsic forearm flexor and extensor stretches  Thumb stretches  5 lbs wrist flexion/extension 2 sets of 10  Green flex bar 10 reps in supination and pronation  Power web (yellow) stretch and grasp and pull     Extrinsic forearm flexor and extensor stretches   Thumb stretches   5 lbs wrist flexion/extension 2 sets of 10   Green flex bar 10 reps in supination and pronation   Green digit flex for thumb IP flexion x 10   Neuro Re-Educ Thumb proprioceptive re-ed: thumb card pushing, thumb proprio stick, knobby football pushes, maze ball, Chinese balls, vibrating ball      Therapeutic Activity  Bulk putty: tan, , roll, pinch and search Yellow putty:  Roll, pinch, pull and retrieve marbles     Lateral and 2 pt. Pinch using a green clothes pin picking up sponges     Lumbrical grasp picking up sponges     Black sports cords pushing in neutral and pronation 2 sets of 10   Black sports cords pulling in neutral and pronation 2 sets of 10   Crate carry 20 lbs walking length of gym and then 30 lbs   Trampoline ball toss with 2 lbs bilateral and one hand and 4 lbs bilateral hands   Large body blade for grasp and total body workout Lateral and 2 pt. Pinch using a green clothes pin picking up sponges     Lumbrical grasp picking up sponges     Black sports cords pushing in neutral and pronation 2 sets of 10   Black sports cords pulling in neutral and pronation 2 sets of 10   Crate carry 20 lbs walking length of gym and then 30 lbs   Trampoline ball toss with 2 lbs bilateral and one hand and 4 lbs bilateral hands   Large body blade for grasp and total body workout   Yellow putty:   Roll, pinch, pull and retrieve marbles      Modalities Ultrasound:  3mHz  1.0 w/cm2  100%  to right thumb UCL for 8 minutes.  Thin strip of Rock tape applied in spiral support of right thumb   Ultrasound:  3mHz  0.8w/cm2  100%  to right thumb UCL  for 8 minutes.       Neuro Re-Ed Min 27      Therapeutic Exercise Min 10 29 15 15    Ther Activity Min  8 30 30   Ultrasound Min 8 8     Total Timed Procedures 45 45 45 45   Total Service Procedures 45 45 45 45   Total Time 45 45 45 45         HEP   See flow sheet    Charges     TA x 2, TE x 1

## 2025-07-22 ENCOUNTER — OFFICE VISIT (OUTPATIENT)
Dept: OCCUPATIONAL MEDICINE | Age: 50
End: 2025-07-22
Attending: STUDENT IN AN ORGANIZED HEALTH CARE EDUCATION/TRAINING PROGRAM
Payer: OTHER MISCELLANEOUS

## 2025-07-22 PROCEDURE — 97110 THERAPEUTIC EXERCISES: CPT

## 2025-07-22 PROCEDURE — 97530 THERAPEUTIC ACTIVITIES: CPT

## 2025-07-22 NOTE — PROGRESS NOTES
Patient: William Siwft (50 year old, male) Referring Provider:  Insurance:   Diagnosis: Sprain of the right thumb UCL Johnathan Solano  WORK[employer] COMP   Date of Surgery: n/a Next MD visit:  WORK[employer]   Precautions:    7/29/25 Referral Information:   Date of Injury: 4/10/25 Date of Evaluation: Req: 8, Auth: 8, Exp: 6/30/2026 07/02/25 POC Auth Visits:  8       Today's Date   7/22/2025    Subjective  Reports no thumb pain in last week's therapy sessions or with home activities.       Pain: 0/10     Objective  See detail of treatment provided below.             Assessment  No pain produced with clinic activities or since last seen. Anticipate he will be ready for planned dc next session.    Goals (to be met in 8 visits)      Not Met Progress Toward Partially Met Met   Patient will report no pain with simulation of work tasks. [] [x] [] []   Patient will present with increase in force tolerance of gripping with the right hand  simulating work tasks, with no more than 1/10 pain to allow for ease with gripping work tools. [] [x] [] []   Patient will demonstrate balanced posture in thumb joints during three-point pinch test and during pinch of ADL tools. [] [x] [] []   Patient will be able to lift, carry, push pull up to 50# without pain. [] [x] [] []   Patient will demonstrate an improvement in the self rated DASH score to 11% or better to reflect functional gains reported, in the achievement of opening a jar, carrying objects and in daily skills.   [] [x] [] []                      Plan  Check lift, carry at 50#.  Likely transition to [facility] for targetted strengthening of thumb.    Treatment Last 4 Visits        7/11/2025 7/16/2025 7/18/2025 7/22/2025   OT Treatment   Treatment Day 3 4 5 6   Therapeutic Exercise Fluidotherapy for 10 minutes to right hand, with AROM x thumb performed.  Wrist PRE's: 5#, 3 sets of 15  Lumbrical  strengthening: with lumbrical gripper 1 with yellow sponge insert, of cotton ball  mix  Pinch strengthening with green clip and cotton ball mix Extrinsic forearm flexor and extensor stretches  Thumb stretches  5 lbs wrist flexion/extension 2 sets of 10  Green flex bar 10 reps in supination and pronation  Power web (yellow) stretch and grasp and pull     Extrinsic forearm flexor and extensor stretches   Thumb stretches   5 lbs wrist flexion/extension 2 sets of 10   Green flex bar 10 reps in supination and pronation   Green digit flex for thumb IP flexion x 10 Sci Fit  2.5 min each direction  Theratubing: grey, 1 set of 15 pulls each of four directions   Therapeutic Activity Bulk putty: tan, , roll, pinch and search Yellow putty:  Roll, pinch, pull and retrieve marbles     Lateral and 2 pt. Pinch using a green clothes pin picking up sponges     Lumbrical grasp picking up sponges     Black sports cords pushing in neutral and pronation 2 sets of 10   Black sports cords pulling in neutral and pronation 2 sets of 10   Crate carry 20 lbs walking length of gym and then 30 lbs   Trampoline ball toss with 2 lbs bilateral and one hand and 4 lbs bilateral hands   Large body blade for grasp and total body workout Lateral and 2 pt. Pinch using a green clothes pin picking up sponges     Lumbrical grasp picking up sponges     Black sports cords pushing in neutral and pronation 2 sets of 10   Black sports cords pulling in neutral and pronation 2 sets of 10   Crate carry 20 lbs walking length of gym and then 30 lbs   Trampoline ball toss with 2 lbs bilateral and one hand and 4 lbs bilateral hands   Large body blade for grasp and total body workout   Yellow putty:   Roll, pinch, pull and retrieve marbles    Body Blade  Push, pull sled  Flip and roll weighted cube  Bulk putty: red, , pinch, pull, tools  Blue clothespin  cotton ball mix  Medicine ball rolls on wall: 8  Medicine ball onto top shelf: 12, 5x  Lift, carry medicine balls: 8, 12, 18   Modalities Ultrasound:  3mHz  0.8w/cm2  100%  to right  thumb UCL  for 8 minutes.        Therapeutic Exercise Min 29 15 15 10   Ther Activity Min 8 30 30 35   Ultrasound Min 8      Total Timed Procedures 45 45 45 45   Total Service Procedures 45 45 45 45   Total Time 45 45 45 45         HEP  Simulate movements with putty for work tasks.    Charges     1 TE, 2 TA    JODY LaraS, OTR/L, CHT

## 2025-07-24 ENCOUNTER — OFFICE VISIT (OUTPATIENT)
Facility: CLINIC | Age: 50
End: 2025-07-24
Payer: OTHER MISCELLANEOUS

## 2025-07-24 ENCOUNTER — APPOINTMENT (OUTPATIENT)
Dept: OCCUPATIONAL MEDICINE | Age: 50
End: 2025-07-24
Attending: STUDENT IN AN ORGANIZED HEALTH CARE EDUCATION/TRAINING PROGRAM
Payer: OTHER MISCELLANEOUS

## 2025-07-24 ENCOUNTER — PATIENT MESSAGE (OUTPATIENT)
Dept: OCCUPATIONAL MEDICINE | Age: 50
End: 2025-07-24

## 2025-07-24 DIAGNOSIS — S63.641S: Primary | ICD-10-CM

## 2025-07-24 PROCEDURE — 99213 OFFICE O/P EST LOW 20 MIN: CPT | Performed by: STUDENT IN AN ORGANIZED HEALTH CARE EDUCATION/TRAINING PROGRAM

## 2025-07-24 PROCEDURE — 97110 THERAPEUTIC EXERCISES: CPT

## 2025-07-24 PROCEDURE — 97530 THERAPEUTIC ACTIVITIES: CPT

## 2025-07-24 RX ORDER — SAXAGLIPTIN 5 MG/1
TABLET, FILM COATED ORAL
COMMUNITY
Start: 2025-06-24

## 2025-07-24 RX ORDER — ROSUVASTATIN CALCIUM 10 MG/1
TABLET, COATED ORAL
COMMUNITY
Start: 2025-06-24

## 2025-07-24 NOTE — PROGRESS NOTES
Discharge Summary  Pt has attended 7 visits in Occupational Therapy.     Patient: William Swift (50 year old, male) Referring Provider:  Insurance:   Diagnosis: Sprain of the right thumb UCL Johnathan Solano  WORKERS COMP   Date of Surgery: n/a Next MD visit:  WORK[employer] COMP   Precautions:    7/29/25 Referral Information:   Date of Injury: 4/10/25 Date of Evaluation: Req: 8, Auth: 8, Exp: 6/30/2026 07/02/25 POC Auth Visits:  8       Today's Date   7/24/2025    Subjective  Reports no pain or limtations; \"I carried a little under a gallon bottle of water and nothing.\"       Pain: 0/10     Objective  See detail of treatment provided below.    Hand Strength       7/2/2025 7/11/2025 7/24/2025   Hand Strength    Rt 100  95    Lt 95  90   3 Pt Pinch Rt 14 14 14       no pain   3 Pt Pinch Lt 16     Lateral Pinch Rt 14 15 17   Lateral Pinch Lt 17       Push/pull gauge: (bilateral) push 59# and pull 64# of force      QuickDASH Outcome Score  Score: (Patient-Rptd) 15.91 % (6/30/2025  4:29 PM)    Post QuickDASH Outcome Score  Post Score: (Patient-Rptd) 2.27 % (7/24/2025  8:02 AM)    13.64 % improvement         Assessment   Patient is a 49 yo male, who has been seen in Occupational Therapy since initial eval on 7/2/25, with last treatment session on 7/24/25.  The patient has attended 7/7 scheduled appointments, with 0 no shows and 0 cancellations.  Focus of skilled OT has been on ROM, pain, and strength, with use of interventions including therapeutic activities, therapeutic exercises, modalities such as US and FT, adapted ADL training, and neuromuscular re-ed to achieve the functional goals listed below. He has shown improvement in strength and pain, with functional improvements reported as in opening a jar, carrying bottle of water, and in daily occupation engagement.  Patient rated Outcome measure of the Quick DASH indicates improvement and a better than normal rating.  Limitations and barriers toward progress  include: none.  He has met the goals as noted below, reaching maximal benefit from skilled OT, and is ready for transition to home program at this time.      Goals (to be met in 8 visits)      Not Met Progress Toward Partially Met Met   Patient will report no pain with simulation of work tasks. [] [] [] [x]   Patient will present with increase in force tolerance of gripping with the right hand  simulating work tasks, with no more than 1/10 pain to allow for ease with gripping work tools. [] [] [] [x]   Patient will demonstrate balanced posture in thumb joints during three-point pinch test and during pinch of ADL tools. [] [] [] [x]   Patient will be able to lift, carry, push pull up to 50# without pain. [] [] [] [x]   Patient will demonstrate an improvement in the self rated DASH score to 11% or better to reflect functional gains reported, in the achievement of opening a jar, carrying objects and in daily skills.   [] [] [] [x]          Plan  DC OT and continue with HEP and self management.    Treatment Last 4 Visits        7/16/2025 7/18/2025 7/22/2025 7/24/2025   OT Treatment   Treatment Day 4 5 6 7   Therapeutic Exercise Extrinsic forearm flexor and extensor stretches  Thumb stretches  5 lbs wrist flexion/extension 2 sets of 10  Green flex bar 10 reps in supination and pronation  Power web (yellow) stretch and grasp and pull     Extrinsic forearm flexor and extensor stretches   Thumb stretches   5 lbs wrist flexion/extension 2 sets of 10   Green flex bar 10 reps in supination and pronation   Green digit flex for thumb IP flexion x 10 Sci Fit  2.5 min each direction  Theratubing: grey, 1 set of 15 pulls each of four directions Sci Fit 3 min each direction  Theratubing: grey: 2 sets of 15 pulls in each of four directions   Therapeutic Activity Yellow putty:  Roll, pinch, pull and retrieve marbles     Lateral and 2 pt. Pinch using a green clothes pin picking up sponges     Lumbrical grasp picking up sponges      Black sports cords pushing in neutral and pronation 2 sets of 10   Black sports cords pulling in neutral and pronation 2 sets of 10   Crate carry 20 lbs walking length of gym and then 30 lbs   Trampoline ball toss with 2 lbs bilateral and one hand and 4 lbs bilateral hands   Large body blade for grasp and total body workout Lateral and 2 pt. Pinch using a green clothes pin picking up sponges     Lumbrical grasp picking up sponges     Black sports cords pushing in neutral and pronation 2 sets of 10   Black sports cords pulling in neutral and pronation 2 sets of 10   Crate carry 20 lbs walking length of gym and then 30 lbs   Trampoline ball toss with 2 lbs bilateral and one hand and 4 lbs bilateral hands   Large body blade for grasp and total body workout   Yellow putty:   Roll, pinch, pull and retrieve marbles    Body Blade  Push, pull sled  Flip and roll weighted cube  Bulk putty: red, , pinch, pull, tools  Blue clothespin  cotton ball mix  Medicine ball rolls on wall: 8  Medicine ball onto top shelf: 12, 5x  Lift, carry medicine balls: 8, 12, 18 Thumb ext activities: lacrosse ball on game tray, blue power web, thumb plunger  Bulk red putty  Green flexbar  Blue power web , twist, and pertubations  Lift, carry 50# in crate  Hand gripper with pegs, on second rung   Therapeutic Exercise Min 15 15 10 12   Ther Activity Min 30 30 35 33   Total Timed Procedures 45 45 45 45   Total Service Procedures 45 45 45 45   Total Time 45 45 45 45         HEP  Continue targeted thumb strengthening    Charges     1 TE, 2 TA    RAIN Lara, OTR/L, CHT

## 2025-07-24 NOTE — PROGRESS NOTES
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    He is feeling close to back to normal following therapy for his thumb injury. The injury occurred during defensive tactics training, which involved repetitive and forceful hand movements, approximately a week after the training session in April. He has been undergoing therapy and has shown significant improvement, with essentially no pain today.    He is eager to resume his normal duties. He looks forward to returning to full duty.    He discusses the possibility of wearing a hand brace during future defensive tactics training sessions to prevent re-injury, especially if the training occurs annually or biennially.    Review of Systems:  Negative unless stated in HPI.    Physical Exam:         Constitutional: NAD. AOx3. Well-developed and Well-nourished.   Psychiatric: Normal mood/ affect/ behavior. Judgment and thought content normal.     Right Upper Extremity:     Skin clean and dry.   No lacerations, no abrasions.  No erythema  No edema  No tenderness in anatomic snuffbox, in ulnar fovea, over dorsal scapholunate interosseous ligament, lateral epicondyle, or over distal pole of the scaphoid.    Non-tender to palpation about the ulnar aspect of the thumb MCP joint today  I feel a firm endpoint with varus and valgus stress of the thumb MCPJ, stable from prior  Able to flex and extend thumb with ease  Brisk capillary refill less than 2 seconds in all digits, bilaterally.    Fingers warm and well perfused  Neurologic:   Sensation intact to light touch light touch in the radial, ulnar, median distributions bilaterally.   No atrophy, normal muscle tone.  Grossly intact sensation       Imaging:  I reviewed the images independently from the professional interpretation, and discussed my  interpretation of the pertinent findings with patient/family.    Previous MRI of right thumb from 6/23/25: Reveals grade 1/2 sprain of the right thumb ulnar collateral ligament; some attenuation of distal aspect of ligament but ligament does still insert onto the proximal phalanx.    Assessment/Plan:  50 year old male with Right thumb ulnar collateral ligament sprain, likely DOI 4/10/25.    The nature of the condition was discussed with the patient today including reviewing the xrays. The risks/benefits, pros/cons and reasonable expectations of treatment options were also discussed today. Education and counselling was given for the above diagnosis.   Assessment & Plan  Right thumb ulnar collateral ligament injury  The sprain occurred in April and has been successfully managed with rest, activity modification, and then a course therapy. He reports feeling close to normal with no pain. He is ready to return to full duty without restrictions.  - Return to work without restrictions starting tomorrow.  - Discontinue therapy sessions unless needed in the future.  - Wear a hand brace during activities with heavy impact to the thumb to prevent re-injury.    All questions answered. He will follow up with me as needed.     Johnathan Solano MD   Hand, Wrist, & Elbow Surgery  [email or web address]         [1] No Known Allergies

## 2025-07-29 ENCOUNTER — APPOINTMENT (OUTPATIENT)
Dept: OCCUPATIONAL MEDICINE | Age: 50
End: 2025-07-29
Attending: STUDENT IN AN ORGANIZED HEALTH CARE EDUCATION/TRAINING PROGRAM
Payer: OTHER MISCELLANEOUS

## 2025-07-31 ENCOUNTER — APPOINTMENT (OUTPATIENT)
Dept: OCCUPATIONAL MEDICINE | Age: 50
End: 2025-07-31
Attending: STUDENT IN AN ORGANIZED HEALTH CARE EDUCATION/TRAINING PROGRAM
Payer: OTHER MISCELLANEOUS

## 2025-08-05 ENCOUNTER — APPOINTMENT (OUTPATIENT)
Dept: OCCUPATIONAL MEDICINE | Age: 50
End: 2025-08-05
Attending: STUDENT IN AN ORGANIZED HEALTH CARE EDUCATION/TRAINING PROGRAM

## 2025-08-12 ENCOUNTER — TELEPHONE (OUTPATIENT)
Dept: FAMILY MEDICINE CLINIC | Facility: CLINIC | Age: 50
End: 2025-08-12

## (undated) NOTE — LETTER
7/24/2025          To Whom It May Concern:    William Swift is currently under my medical care and may return to work starting Friday July 25,2025 with no restrictions.       If you require additional information please contact our office.        Sincerely,    Johnathan Solano MD

## (undated) NOTE — LETTER
[facility], [facility], [place]  [address] [address]  PH: [phone]  FAX: [phone]              Date: 1/3/2024      Patient Name: William Swift            To Whom it may concern:    The above patient was evaluated at the [facility] for treatment of a medical condition.    This patient should be excused from attending work from 1/3/2024   through 1/4/2024.            The patient may return to work on 1/5/2024.    Restrictions: none.    Sincerely,      Sesar Calvo MD

## (undated) NOTE — LETTER
Date & Time: 6/2/2025, 12:47 PM  Patient: William Swift  Encounter Provider(s):    Gabe Rene APRN       To Whom It May Concern:    William Swift was seen and treated in our department on 6/2/2025.  Please allow desk duty until released by orthopedics.    If you have any questions or concerns, please do not hesitate to call.        _____________________________  Physician/APC Signature

## (undated) NOTE — LETTER
[birth year]        [address]      Dear Terence Cai,    [address] records indicate that you have outstanding lab work and or testing that was ordered for you and has not yet been completed:  Orders Placed This Encounter      CBC

## (undated) NOTE — LETTER
Date: 6/30/2025    Patient Name: William Swift          To Whom it may concern:     William Swift is currently under my medical care .  Activity is restricted as follows: No use of Right upper Extremity and desk duty only for the next 4 weeks.      Sincerely,    Johnathan Solano MD

## (undated) NOTE — LETTER
6/10/2025          To Whom It May Concern:    William Swift is currently under my medical care .  Activity is restricted as follows: No use of Right upper Extremity and desk duty only for the next 4 weeks.    If you require additional information please contact our office.        Sincerely,    Johnathan Solano MD